# Patient Record
Sex: MALE | Race: WHITE | Employment: OTHER | URBAN - METROPOLITAN AREA
[De-identification: names, ages, dates, MRNs, and addresses within clinical notes are randomized per-mention and may not be internally consistent; named-entity substitution may affect disease eponyms.]

---

## 2018-01-24 RX ORDER — INSULIN GLARGINE 100 [IU]/ML
36 INJECTION, SOLUTION SUBCUTANEOUS
COMMUNITY
Start: 2017-11-28

## 2018-01-24 RX ORDER — AMLODIPINE BESYLATE 10 MG/1
10 TABLET ORAL DAILY
COMMUNITY
Start: 2017-12-21

## 2018-01-24 RX ORDER — METFORMIN HYDROCHLORIDE 500 MG/1
500 TABLET ORAL 2 TIMES DAILY WITH MEALS
COMMUNITY

## 2018-01-24 RX ORDER — LISINOPRIL 40 MG/1
40 TABLET ORAL DAILY
COMMUNITY
Start: 2017-11-27

## 2018-01-24 RX ORDER — SITAGLIPTIN 100 MG/1
100 TABLET, FILM COATED ORAL DAILY
COMMUNITY
Start: 2017-12-22

## 2018-01-24 RX ORDER — HYDROCHLOROTHIAZIDE 25 MG/1
12.5 TABLET ORAL DAILY
COMMUNITY
Start: 2017-12-11

## 2018-01-24 RX ORDER — ASPIRIN 81 MG/1
81 TABLET ORAL DAILY
COMMUNITY

## 2018-01-24 RX ORDER — ATORVASTATIN CALCIUM 20 MG/1
20 TABLET, FILM COATED ORAL DAILY
COMMUNITY
Start: 2017-12-11

## 2018-01-26 ENCOUNTER — ANESTHESIA EVENT (OUTPATIENT)
Dept: SURGERY | Age: 58
DRG: 470 | End: 2018-01-26
Payer: COMMERCIAL

## 2018-01-29 ENCOUNTER — APPOINTMENT (OUTPATIENT)
Dept: GENERAL RADIOLOGY | Age: 58
DRG: 470 | End: 2018-01-29
Attending: ORTHOPAEDIC SURGERY
Payer: COMMERCIAL

## 2018-01-29 ENCOUNTER — ANESTHESIA (OUTPATIENT)
Dept: SURGERY | Age: 58
DRG: 470 | End: 2018-01-29
Payer: COMMERCIAL

## 2018-01-29 ENCOUNTER — HOSPITAL ENCOUNTER (INPATIENT)
Age: 58
LOS: 2 days | Discharge: HOME HEALTH | DRG: 470 | End: 2018-01-31
Attending: ORTHOPAEDIC SURGERY | Admitting: ORTHOPAEDIC SURGERY
Payer: COMMERCIAL

## 2018-01-29 DIAGNOSIS — M17.12 PRIMARY OSTEOARTHRITIS OF LEFT KNEE: Primary | ICD-10-CM

## 2018-01-29 PROBLEM — E11.9 DM (DIABETES MELLITUS) (HCC): Status: ACTIVE | Noted: 2018-01-29

## 2018-01-29 PROBLEM — E78.5 HLD (HYPERLIPIDEMIA): Status: ACTIVE | Noted: 2018-01-29

## 2018-01-29 PROBLEM — I10 HTN (HYPERTENSION): Status: ACTIVE | Noted: 2018-01-29

## 2018-01-29 PROBLEM — G47.33 OSA (OBSTRUCTIVE SLEEP APNEA): Status: ACTIVE | Noted: 2018-01-29

## 2018-01-29 LAB
ANION GAP SERPL CALC-SCNC: 11 MMOL/L (ref 3–18)
APTT PPP: 23.5 SEC (ref 23–36.4)
BUN SERPL-MCNC: 17 MG/DL (ref 7–18)
BUN/CREAT SERPL: 20 (ref 12–20)
CALCIUM SERPL-MCNC: 8.5 MG/DL (ref 8.5–10.1)
CHLORIDE SERPL-SCNC: 105 MMOL/L (ref 100–108)
CO2 SERPL-SCNC: 26 MMOL/L (ref 21–32)
CREAT SERPL-MCNC: 0.86 MG/DL (ref 0.6–1.3)
EST. AVERAGE GLUCOSE BLD GHB EST-MCNC: 120 MG/DL
GLUCOSE BLD STRIP.AUTO-MCNC: 117 MG/DL (ref 70–110)
GLUCOSE BLD STRIP.AUTO-MCNC: 146 MG/DL (ref 70–110)
GLUCOSE BLD STRIP.AUTO-MCNC: 155 MG/DL (ref 70–110)
GLUCOSE BLD STRIP.AUTO-MCNC: 155 MG/DL (ref 70–110)
GLUCOSE SERPL-MCNC: 185 MG/DL (ref 74–99)
HBA1C MFR BLD: 5.8 % (ref 4.2–5.6)
HCT VFR BLD AUTO: 39.2 % (ref 36–48)
HGB BLD-MCNC: 13.1 G/DL (ref 13–16)
INR PPP: 0.9 (ref 0.8–1.2)
POTASSIUM SERPL-SCNC: 3.1 MMOL/L (ref 3.5–5.5)
PROTHROMBIN TIME: 12 SEC (ref 11.5–15.2)
SODIUM SERPL-SCNC: 142 MMOL/L (ref 136–145)

## 2018-01-29 PROCEDURE — 77030008462 HC STPLR SKN PROX J&J -A: Performed by: ORTHOPAEDIC SURGERY

## 2018-01-29 PROCEDURE — C1713 ANCHOR/SCREW BN/BN,TIS/BN: HCPCS | Performed by: ORTHOPAEDIC SURGERY

## 2018-01-29 PROCEDURE — 77030020257 HC SOL INJ SOD CL 0.45% 1000ML BG

## 2018-01-29 PROCEDURE — 76210000016 HC OR PH I REC 1 TO 1.5 HR: Performed by: ORTHOPAEDIC SURGERY

## 2018-01-29 PROCEDURE — 77030008477 HC STYL SATN SLP COVD -A: Performed by: ANESTHESIOLOGY

## 2018-01-29 PROCEDURE — 74011250637 HC RX REV CODE- 250/637: Performed by: ORTHOPAEDIC SURGERY

## 2018-01-29 PROCEDURE — 77030013708 HC HNDPC SUC IRR PULS STRY –B: Performed by: ORTHOPAEDIC SURGERY

## 2018-01-29 PROCEDURE — 85730 THROMBOPLASTIN TIME PARTIAL: CPT | Performed by: ORTHOPAEDIC SURGERY

## 2018-01-29 PROCEDURE — 74011250636 HC RX REV CODE- 250/636: Performed by: NURSE ANESTHETIST, CERTIFIED REGISTERED

## 2018-01-29 PROCEDURE — 36415 COLL VENOUS BLD VENIPUNCTURE: CPT | Performed by: ORTHOPAEDIC SURGERY

## 2018-01-29 PROCEDURE — 77030006835 HC BLD SAW SAG STRY -B: Performed by: ORTHOPAEDIC SURGERY

## 2018-01-29 PROCEDURE — 77030006774 HC BLD SAW OSC BRSM -B: Performed by: ORTHOPAEDIC SURGERY

## 2018-01-29 PROCEDURE — 77030003666 HC NDL SPINAL BD -A: Performed by: ORTHOPAEDIC SURGERY

## 2018-01-29 PROCEDURE — 77030032490 HC SLV COMPR SCD KNE COVD -B: Performed by: ORTHOPAEDIC SURGERY

## 2018-01-29 PROCEDURE — 83036 HEMOGLOBIN GLYCOSYLATED A1C: CPT | Performed by: NURSE PRACTITIONER

## 2018-01-29 PROCEDURE — 76010000171 HC OR TIME 2 TO 2.5 HR INTENSV-TIER 1: Performed by: ORTHOPAEDIC SURGERY

## 2018-01-29 PROCEDURE — 97530 THERAPEUTIC ACTIVITIES: CPT

## 2018-01-29 PROCEDURE — 85610 PROTHROMBIN TIME: CPT | Performed by: ORTHOPAEDIC SURGERY

## 2018-01-29 PROCEDURE — 74011250636 HC RX REV CODE- 250/636: Performed by: ORTHOPAEDIC SURGERY

## 2018-01-29 PROCEDURE — 74011000250 HC RX REV CODE- 250: Performed by: ORTHOPAEDIC SURGERY

## 2018-01-29 PROCEDURE — 76060000035 HC ANESTHESIA 2 TO 2.5 HR: Performed by: ORTHOPAEDIC SURGERY

## 2018-01-29 PROCEDURE — 77030010785: Performed by: ORTHOPAEDIC SURGERY

## 2018-01-29 PROCEDURE — 74011000272 HC RX REV CODE- 272: Performed by: ORTHOPAEDIC SURGERY

## 2018-01-29 PROCEDURE — 77030020754 HC CUF TRNQT 2BLA STRY -B: Performed by: ORTHOPAEDIC SURGERY

## 2018-01-29 PROCEDURE — 77030027138 HC INCENT SPIROMETER -A

## 2018-01-29 PROCEDURE — C1776 JOINT DEVICE (IMPLANTABLE): HCPCS | Performed by: ORTHOPAEDIC SURGERY

## 2018-01-29 PROCEDURE — 77030013079 HC BLNKT BAIR HGGR 3M -A: Performed by: ANESTHESIOLOGY

## 2018-01-29 PROCEDURE — 97161 PT EVAL LOW COMPLEX 20 MIN: CPT

## 2018-01-29 PROCEDURE — 74011636637 HC RX REV CODE- 636/637

## 2018-01-29 PROCEDURE — 97162 PT EVAL MOD COMPLEX 30 MIN: CPT

## 2018-01-29 PROCEDURE — 74011636637 HC RX REV CODE- 636/637: Performed by: NURSE PRACTITIONER

## 2018-01-29 PROCEDURE — 82962 GLUCOSE BLOOD TEST: CPT

## 2018-01-29 PROCEDURE — 77030018836 HC SOL IRR NACL ICUM -A: Performed by: ORTHOPAEDIC SURGERY

## 2018-01-29 PROCEDURE — 80048 BASIC METABOLIC PNL TOTAL CA: CPT | Performed by: ORTHOPAEDIC SURGERY

## 2018-01-29 PROCEDURE — 0SRD0J9 REPLACEMENT OF LEFT KNEE JOINT WITH SYNTHETIC SUBSTITUTE, CEMENTED, OPEN APPROACH: ICD-10-PCS | Performed by: ORTHOPAEDIC SURGERY

## 2018-01-29 PROCEDURE — 77030003029 HC SUT VCRL J&J -B: Performed by: ORTHOPAEDIC SURGERY

## 2018-01-29 PROCEDURE — 85018 HEMOGLOBIN: CPT | Performed by: ORTHOPAEDIC SURGERY

## 2018-01-29 PROCEDURE — 74011250637 HC RX REV CODE- 250/637: Performed by: NURSE ANESTHETIST, CERTIFIED REGISTERED

## 2018-01-29 PROCEDURE — 74011000258 HC RX REV CODE- 258: Performed by: ORTHOPAEDIC SURGERY

## 2018-01-29 PROCEDURE — 65270000029 HC RM PRIVATE

## 2018-01-29 PROCEDURE — 74011250636 HC RX REV CODE- 250/636

## 2018-01-29 PROCEDURE — 77030011640 HC PAD GRND REM COVD -A: Performed by: ORTHOPAEDIC SURGERY

## 2018-01-29 PROCEDURE — 74011000250 HC RX REV CODE- 250

## 2018-01-29 PROCEDURE — 77030031139 HC SUT VCRL2 J&J -A: Performed by: ORTHOPAEDIC SURGERY

## 2018-01-29 PROCEDURE — 73560 X-RAY EXAM OF KNEE 1 OR 2: CPT

## 2018-01-29 PROCEDURE — 77030008683 HC TU ET CUF COVD -A: Performed by: ANESTHESIOLOGY

## 2018-01-29 DEVICE — CEMENT BNE 20ML 41GM FULL DOSE PMMA W/ TOBRA M VISC RADPQ: Type: IMPLANTABLE DEVICE | Site: KNEE | Status: FUNCTIONAL

## 2018-01-29 DEVICE — IMPLANTABLE DEVICE: Type: IMPLANTABLE DEVICE | Site: KNEE | Status: FUNCTIONAL

## 2018-01-29 DEVICE — IMPLANT PAT DIA36MM THK10MM X3 SYMMETRIC TRIATHLON: Type: IMPLANTABLE DEVICE | Site: KNEE | Status: FUNCTIONAL

## 2018-01-29 DEVICE — FEM KNE CEM CR SZ 5 LT -- TRIATHLON: Type: IMPLANTABLE DEVICE | Site: KNEE | Status: FUNCTIONAL

## 2018-01-29 DEVICE — COMPONENT KNEE CEM X3 TRIATHLON: Type: IMPLANTABLE DEVICE | Site: KNEE | Status: FUNCTIONAL

## 2018-01-29 RX ORDER — FENTANYL CITRATE 50 UG/ML
50 INJECTION, SOLUTION INTRAMUSCULAR; INTRAVENOUS AS NEEDED
Status: DISCONTINUED | OUTPATIENT
Start: 2018-01-29 | End: 2018-01-29 | Stop reason: HOSPADM

## 2018-01-29 RX ORDER — HYDROCHLOROTHIAZIDE 25 MG/1
12.5 TABLET ORAL DAILY
Status: DISCONTINUED | OUTPATIENT
Start: 2018-01-29 | End: 2018-01-31 | Stop reason: HOSPADM

## 2018-01-29 RX ORDER — PROPOFOL 10 MG/ML
INJECTION, EMULSION INTRAVENOUS AS NEEDED
Status: DISCONTINUED | OUTPATIENT
Start: 2018-01-29 | End: 2018-01-29 | Stop reason: HOSPADM

## 2018-01-29 RX ORDER — GLYCOPYRROLATE 0.2 MG/ML
INJECTION INTRAMUSCULAR; INTRAVENOUS AS NEEDED
Status: DISCONTINUED | OUTPATIENT
Start: 2018-01-29 | End: 2018-01-29 | Stop reason: HOSPADM

## 2018-01-29 RX ORDER — ENOXAPARIN SODIUM 100 MG/ML
40 INJECTION SUBCUTANEOUS EVERY 24 HOURS
Status: DISCONTINUED | OUTPATIENT
Start: 2018-01-29 | End: 2018-01-31 | Stop reason: HOSPADM

## 2018-01-29 RX ORDER — SODIUM CHLORIDE 0.9 % (FLUSH) 0.9 %
5-10 SYRINGE (ML) INJECTION AS NEEDED
Status: DISCONTINUED | OUTPATIENT
Start: 2018-01-29 | End: 2018-01-31 | Stop reason: HOSPADM

## 2018-01-29 RX ORDER — ACETAMINOPHEN 325 MG/1
650 TABLET ORAL
Status: DISCONTINUED | OUTPATIENT
Start: 2018-01-29 | End: 2018-01-31 | Stop reason: HOSPADM

## 2018-01-29 RX ORDER — HYDROCODONE BITARTRATE AND ACETAMINOPHEN 5; 325 MG/1; MG/1
1 TABLET ORAL ONCE
Status: DISCONTINUED | OUTPATIENT
Start: 2018-01-29 | End: 2018-01-29 | Stop reason: HOSPADM

## 2018-01-29 RX ORDER — DOCUSATE SODIUM 100 MG/1
100 CAPSULE, LIQUID FILLED ORAL 2 TIMES DAILY
Status: DISCONTINUED | OUTPATIENT
Start: 2018-01-29 | End: 2018-01-31 | Stop reason: HOSPADM

## 2018-01-29 RX ORDER — DEXTROSE 50 % IN WATER (D50W) INTRAVENOUS SYRINGE
25-50 AS NEEDED
Status: DISCONTINUED | OUTPATIENT
Start: 2018-01-29 | End: 2018-01-29 | Stop reason: HOSPADM

## 2018-01-29 RX ORDER — MAGNESIUM SULFATE 100 %
4 CRYSTALS MISCELLANEOUS AS NEEDED
Status: DISCONTINUED | OUTPATIENT
Start: 2018-01-29 | End: 2018-01-29 | Stop reason: HOSPADM

## 2018-01-29 RX ORDER — MORPHINE SULFATE 2 MG/ML
2 INJECTION, SOLUTION INTRAMUSCULAR; INTRAVENOUS
Status: DISCONTINUED | OUTPATIENT
Start: 2018-01-29 | End: 2018-01-30

## 2018-01-29 RX ORDER — DEXTROSE 50 % IN WATER (D50W) INTRAVENOUS SYRINGE
25-50 AS NEEDED
Status: DISCONTINUED | OUTPATIENT
Start: 2018-01-29 | End: 2018-01-31 | Stop reason: HOSPADM

## 2018-01-29 RX ORDER — ONDANSETRON 2 MG/ML
INJECTION INTRAMUSCULAR; INTRAVENOUS AS NEEDED
Status: DISCONTINUED | OUTPATIENT
Start: 2018-01-29 | End: 2018-01-29 | Stop reason: HOSPADM

## 2018-01-29 RX ORDER — MIDAZOLAM HYDROCHLORIDE 1 MG/ML
INJECTION, SOLUTION INTRAMUSCULAR; INTRAVENOUS AS NEEDED
Status: DISCONTINUED | OUTPATIENT
Start: 2018-01-29 | End: 2018-01-29 | Stop reason: HOSPADM

## 2018-01-29 RX ORDER — CEFAZOLIN SODIUM 2 G/50ML
2 SOLUTION INTRAVENOUS
Status: COMPLETED | OUTPATIENT
Start: 2018-01-29 | End: 2018-01-29

## 2018-01-29 RX ORDER — LIDOCAINE HYDROCHLORIDE 20 MG/ML
INJECTION, SOLUTION EPIDURAL; INFILTRATION; INTRACAUDAL; PERINEURAL AS NEEDED
Status: DISCONTINUED | OUTPATIENT
Start: 2018-01-29 | End: 2018-01-29 | Stop reason: HOSPADM

## 2018-01-29 RX ORDER — HYDROMORPHONE HYDROCHLORIDE 1 MG/ML
INJECTION, SOLUTION INTRAMUSCULAR; INTRAVENOUS; SUBCUTANEOUS AS NEEDED
Status: DISCONTINUED | OUTPATIENT
Start: 2018-01-29 | End: 2018-01-29 | Stop reason: HOSPADM

## 2018-01-29 RX ORDER — SODIUM CHLORIDE 0.9 % (FLUSH) 0.9 %
5-10 SYRINGE (ML) INJECTION EVERY 8 HOURS
Status: DISCONTINUED | OUTPATIENT
Start: 2018-01-29 | End: 2018-01-31 | Stop reason: HOSPADM

## 2018-01-29 RX ORDER — HYDROMORPHONE HYDROCHLORIDE 2 MG/ML
0.5 INJECTION, SOLUTION INTRAMUSCULAR; INTRAVENOUS; SUBCUTANEOUS
Status: DISCONTINUED | OUTPATIENT
Start: 2018-01-29 | End: 2018-01-29 | Stop reason: HOSPADM

## 2018-01-29 RX ORDER — SODIUM CHLORIDE, SODIUM LACTATE, POTASSIUM CHLORIDE, CALCIUM CHLORIDE 600; 310; 30; 20 MG/100ML; MG/100ML; MG/100ML; MG/100ML
75 INJECTION, SOLUTION INTRAVENOUS CONTINUOUS
Status: DISCONTINUED | OUTPATIENT
Start: 2018-01-29 | End: 2018-01-29 | Stop reason: HOSPADM

## 2018-01-29 RX ORDER — INSULIN LISPRO 100 [IU]/ML
INJECTION, SOLUTION INTRAVENOUS; SUBCUTANEOUS
Status: DISPENSED
Start: 2018-01-29 | End: 2018-01-29

## 2018-01-29 RX ORDER — INSULIN LISPRO 100 [IU]/ML
INJECTION, SOLUTION INTRAVENOUS; SUBCUTANEOUS
Status: COMPLETED
Start: 2018-01-29 | End: 2018-01-29

## 2018-01-29 RX ORDER — KETOROLAC TROMETHAMINE 30 MG/ML
INJECTION, SOLUTION INTRAMUSCULAR; INTRAVENOUS AS NEEDED
Status: DISCONTINUED | OUTPATIENT
Start: 2018-01-29 | End: 2018-01-29 | Stop reason: HOSPADM

## 2018-01-29 RX ORDER — ZOLPIDEM TARTRATE 5 MG/1
5 TABLET ORAL
Status: DISCONTINUED | OUTPATIENT
Start: 2018-01-29 | End: 2018-01-31 | Stop reason: HOSPADM

## 2018-01-29 RX ORDER — METFORMIN HYDROCHLORIDE 500 MG/1
500 TABLET ORAL 2 TIMES DAILY WITH MEALS
Status: DISCONTINUED | OUTPATIENT
Start: 2018-01-29 | End: 2018-01-29

## 2018-01-29 RX ORDER — ONDANSETRON 2 MG/ML
4 INJECTION INTRAMUSCULAR; INTRAVENOUS
Status: DISCONTINUED | OUTPATIENT
Start: 2018-01-29 | End: 2018-01-31 | Stop reason: HOSPADM

## 2018-01-29 RX ORDER — LISINOPRIL 40 MG/1
40 TABLET ORAL DAILY
Status: DISCONTINUED | OUTPATIENT
Start: 2018-01-29 | End: 2018-01-31 | Stop reason: HOSPADM

## 2018-01-29 RX ORDER — NALOXONE HYDROCHLORIDE 0.4 MG/ML
0.4 INJECTION, SOLUTION INTRAMUSCULAR; INTRAVENOUS; SUBCUTANEOUS AS NEEDED
Status: DISCONTINUED | OUTPATIENT
Start: 2018-01-29 | End: 2018-01-31 | Stop reason: HOSPADM

## 2018-01-29 RX ORDER — OXYCODONE HYDROCHLORIDE 5 MG/1
5 TABLET ORAL
Status: DISCONTINUED | OUTPATIENT
Start: 2018-01-29 | End: 2018-01-30

## 2018-01-29 RX ORDER — AMLODIPINE BESYLATE 10 MG/1
10 TABLET ORAL DAILY
Status: DISCONTINUED | OUTPATIENT
Start: 2018-01-29 | End: 2018-01-31 | Stop reason: HOSPADM

## 2018-01-29 RX ORDER — VECURONIUM BROMIDE FOR INJECTION 1 MG/ML
INJECTION, POWDER, LYOPHILIZED, FOR SOLUTION INTRAVENOUS AS NEEDED
Status: DISCONTINUED | OUTPATIENT
Start: 2018-01-29 | End: 2018-01-29 | Stop reason: HOSPADM

## 2018-01-29 RX ORDER — SODIUM CHLORIDE, SODIUM LACTATE, POTASSIUM CHLORIDE, CALCIUM CHLORIDE 600; 310; 30; 20 MG/100ML; MG/100ML; MG/100ML; MG/100ML
75 INJECTION, SOLUTION INTRAVENOUS CONTINUOUS
Status: DISCONTINUED | OUTPATIENT
Start: 2018-01-29 | End: 2018-01-30

## 2018-01-29 RX ORDER — INSULIN LISPRO 100 [IU]/ML
INJECTION, SOLUTION INTRAVENOUS; SUBCUTANEOUS
Status: DISCONTINUED | OUTPATIENT
Start: 2018-01-29 | End: 2018-01-31 | Stop reason: HOSPADM

## 2018-01-29 RX ORDER — FAMOTIDINE 20 MG/1
20 TABLET, FILM COATED ORAL ONCE
Status: COMPLETED | OUTPATIENT
Start: 2018-01-29 | End: 2018-01-29

## 2018-01-29 RX ORDER — INSULIN LISPRO 100 [IU]/ML
INJECTION, SOLUTION INTRAVENOUS; SUBCUTANEOUS ONCE
Status: COMPLETED | OUTPATIENT
Start: 2018-01-29 | End: 2018-01-29

## 2018-01-29 RX ORDER — DIPHENHYDRAMINE HCL 25 MG
25 CAPSULE ORAL
Status: DISCONTINUED | OUTPATIENT
Start: 2018-01-29 | End: 2018-01-31 | Stop reason: HOSPADM

## 2018-01-29 RX ORDER — ASPIRIN 81 MG/1
81 TABLET ORAL DAILY
Status: DISCONTINUED | OUTPATIENT
Start: 2018-01-29 | End: 2018-01-31 | Stop reason: HOSPADM

## 2018-01-29 RX ORDER — DIPHENHYDRAMINE HYDROCHLORIDE 50 MG/ML
12.5 INJECTION, SOLUTION INTRAMUSCULAR; INTRAVENOUS
Status: DISCONTINUED | OUTPATIENT
Start: 2018-01-29 | End: 2018-01-31 | Stop reason: HOSPADM

## 2018-01-29 RX ORDER — NEOSTIGMINE METHYLSULFATE 5 MG/5 ML
SYRINGE (ML) INTRAVENOUS AS NEEDED
Status: DISCONTINUED | OUTPATIENT
Start: 2018-01-29 | End: 2018-01-29 | Stop reason: HOSPADM

## 2018-01-29 RX ORDER — DIPHENHYDRAMINE HYDROCHLORIDE 50 MG/ML
25 INJECTION, SOLUTION INTRAMUSCULAR; INTRAVENOUS
Status: DISCONTINUED | OUTPATIENT
Start: 2018-01-29 | End: 2018-01-29 | Stop reason: HOSPADM

## 2018-01-29 RX ORDER — FENTANYL CITRATE 50 UG/ML
INJECTION, SOLUTION INTRAMUSCULAR; INTRAVENOUS AS NEEDED
Status: DISCONTINUED | OUTPATIENT
Start: 2018-01-29 | End: 2018-01-29 | Stop reason: HOSPADM

## 2018-01-29 RX ORDER — INSULIN LISPRO 100 [IU]/ML
INJECTION, SOLUTION INTRAVENOUS; SUBCUTANEOUS ONCE
Status: DISCONTINUED | OUTPATIENT
Start: 2018-01-29 | End: 2018-01-29 | Stop reason: HOSPADM

## 2018-01-29 RX ORDER — ATORVASTATIN CALCIUM 20 MG/1
20 TABLET, FILM COATED ORAL DAILY
Status: DISCONTINUED | OUTPATIENT
Start: 2018-01-29 | End: 2018-01-31 | Stop reason: HOSPADM

## 2018-01-29 RX ORDER — MAGNESIUM SULFATE 100 %
4 CRYSTALS MISCELLANEOUS AS NEEDED
Status: DISCONTINUED | OUTPATIENT
Start: 2018-01-29 | End: 2018-01-31 | Stop reason: HOSPADM

## 2018-01-29 RX ORDER — DEXAMETHASONE SODIUM PHOSPHATE 4 MG/ML
INJECTION, SOLUTION INTRA-ARTICULAR; INTRALESIONAL; INTRAMUSCULAR; INTRAVENOUS; SOFT TISSUE AS NEEDED
Status: DISCONTINUED | OUTPATIENT
Start: 2018-01-29 | End: 2018-01-29 | Stop reason: HOSPADM

## 2018-01-29 RX ORDER — CEFAZOLIN SODIUM 2 G/50ML
2 SOLUTION INTRAVENOUS EVERY 8 HOURS
Status: DISCONTINUED | OUTPATIENT
Start: 2018-01-29 | End: 2018-01-29

## 2018-01-29 RX ORDER — CEFAZOLIN SODIUM 2 G/50ML
2 SOLUTION INTRAVENOUS EVERY 8 HOURS
Status: COMPLETED | OUTPATIENT
Start: 2018-01-29 | End: 2018-01-30

## 2018-01-29 RX ORDER — LIDOCAINE HYDROCHLORIDE 10 MG/ML
0.1 INJECTION INFILTRATION; PERINEURAL AS NEEDED
Status: DISCONTINUED | OUTPATIENT
Start: 2018-01-29 | End: 2018-01-29 | Stop reason: HOSPADM

## 2018-01-29 RX ADMIN — LISINOPRIL 40 MG: 40 TABLET ORAL at 12:00

## 2018-01-29 RX ADMIN — ENOXAPARIN SODIUM 40 MG: 40 INJECTION SUBCUTANEOUS at 16:15

## 2018-01-29 RX ADMIN — HYDROMORPHONE HYDROCHLORIDE 0.5 MG: 2 INJECTION INTRAMUSCULAR; INTRAVENOUS; SUBCUTANEOUS at 10:25

## 2018-01-29 RX ADMIN — HYDROMORPHONE HYDROCHLORIDE 1 MG: 1 INJECTION, SOLUTION INTRAMUSCULAR; INTRAVENOUS; SUBCUTANEOUS at 09:32

## 2018-01-29 RX ADMIN — INSULIN LISPRO 3 UNITS: 100 INJECTION, SOLUTION INTRAVENOUS; SUBCUTANEOUS at 10:45

## 2018-01-29 RX ADMIN — MIDAZOLAM HYDROCHLORIDE 2 MG: 1 INJECTION, SOLUTION INTRAMUSCULAR; INTRAVENOUS at 07:36

## 2018-01-29 RX ADMIN — OXYCODONE HYDROCHLORIDE 5 MG: 5 TABLET ORAL at 16:32

## 2018-01-29 RX ADMIN — LIDOCAINE HYDROCHLORIDE 100 MG: 20 INJECTION, SOLUTION EPIDURAL; INFILTRATION; INTRACAUDAL; PERINEURAL at 07:43

## 2018-01-29 RX ADMIN — SODIUM CHLORIDE, SODIUM LACTATE, POTASSIUM CHLORIDE, AND CALCIUM CHLORIDE 75 ML/HR: 600; 310; 30; 20 INJECTION, SOLUTION INTRAVENOUS at 17:23

## 2018-01-29 RX ADMIN — KETOROLAC TROMETHAMINE 30 MG: 30 INJECTION, SOLUTION INTRAMUSCULAR; INTRAVENOUS at 09:53

## 2018-01-29 RX ADMIN — HYDROMORPHONE HYDROCHLORIDE 0.5 MG: 2 INJECTION INTRAMUSCULAR; INTRAVENOUS; SUBCUTANEOUS at 10:30

## 2018-01-29 RX ADMIN — FENTANYL CITRATE 50 MCG: 50 INJECTION, SOLUTION INTRAMUSCULAR; INTRAVENOUS at 10:00

## 2018-01-29 RX ADMIN — CEFAZOLIN SODIUM 2 G: 2 SOLUTION INTRAVENOUS at 07:38

## 2018-01-29 RX ADMIN — AMLODIPINE BESYLATE 10 MG: 10 TABLET ORAL at 12:00

## 2018-01-29 RX ADMIN — ACETAMINOPHEN 650 MG: 325 TABLET, FILM COATED ORAL at 14:03

## 2018-01-29 RX ADMIN — HYDROCHLOROTHIAZIDE 12.5 MG: 25 TABLET ORAL at 14:03

## 2018-01-29 RX ADMIN — HYDROMORPHONE HYDROCHLORIDE 0.5 MG: 2 INJECTION INTRAMUSCULAR; INTRAVENOUS; SUBCUTANEOUS at 10:20

## 2018-01-29 RX ADMIN — FENTANYL CITRATE 50 MCG: 50 INJECTION, SOLUTION INTRAMUSCULAR; INTRAVENOUS at 10:10

## 2018-01-29 RX ADMIN — SODIUM CHLORIDE, SODIUM LACTATE, POTASSIUM CHLORIDE, AND CALCIUM CHLORIDE 75 ML/HR: 600; 310; 30; 20 INJECTION, SOLUTION INTRAVENOUS at 07:31

## 2018-01-29 RX ADMIN — GLYCOPYRROLATE 0.4 MG: 0.2 INJECTION INTRAMUSCULAR; INTRAVENOUS at 09:33

## 2018-01-29 RX ADMIN — VECURONIUM BROMIDE FOR INJECTION 8 MG: 1 INJECTION, POWDER, LYOPHILIZED, FOR SOLUTION INTRAVENOUS at 07:44

## 2018-01-29 RX ADMIN — ASPIRIN 81 MG: 81 TABLET, COATED ORAL at 12:00

## 2018-01-29 RX ADMIN — FENTANYL CITRATE 100 MCG: 50 INJECTION, SOLUTION INTRAMUSCULAR; INTRAVENOUS at 07:43

## 2018-01-29 RX ADMIN — VECURONIUM BROMIDE FOR INJECTION 2 MG: 1 INJECTION, POWDER, LYOPHILIZED, FOR SOLUTION INTRAVENOUS at 08:33

## 2018-01-29 RX ADMIN — FENTANYL CITRATE 100 MCG: 50 INJECTION, SOLUTION INTRAMUSCULAR; INTRAVENOUS at 08:33

## 2018-01-29 RX ADMIN — FAMOTIDINE 20 MG: 20 TABLET, FILM COATED ORAL at 07:32

## 2018-01-29 RX ADMIN — HYDROMORPHONE HYDROCHLORIDE 0.5 MG: 2 INJECTION INTRAMUSCULAR; INTRAVENOUS; SUBCUTANEOUS at 10:35

## 2018-01-29 RX ADMIN — ATORVASTATIN CALCIUM 20 MG: 20 TABLET, FILM COATED ORAL at 12:00

## 2018-01-29 RX ADMIN — CEFAZOLIN SODIUM 2 G: 2 SOLUTION INTRAVENOUS at 16:15

## 2018-01-29 RX ADMIN — DOCUSATE SODIUM 100 MG: 100 CAPSULE, LIQUID FILLED ORAL at 12:00

## 2018-01-29 RX ADMIN — PROPOFOL 150 MG: 10 INJECTION, EMULSION INTRAVENOUS at 07:44

## 2018-01-29 RX ADMIN — Medication 3 MG: at 09:33

## 2018-01-29 RX ADMIN — ONDANSETRON 4 MG: 2 INJECTION INTRAMUSCULAR; INTRAVENOUS at 09:33

## 2018-01-29 RX ADMIN — Medication 10 ML: at 22:20

## 2018-01-29 RX ADMIN — OXYCODONE HYDROCHLORIDE 5 MG: 5 TABLET ORAL at 22:18

## 2018-01-29 RX ADMIN — DEXAMETHASONE SODIUM PHOSPHATE 4 MG: 4 INJECTION, SOLUTION INTRA-ARTICULAR; INTRALESIONAL; INTRAMUSCULAR; INTRAVENOUS; SOFT TISSUE at 07:57

## 2018-01-29 RX ADMIN — DOCUSATE SODIUM 100 MG: 100 CAPSULE, LIQUID FILLED ORAL at 18:20

## 2018-01-29 RX ADMIN — INSULIN DETEMIR 30 UNITS: 100 INJECTION, SOLUTION SUBCUTANEOUS at 22:17

## 2018-01-29 NOTE — PROGRESS NOTES
conducted a pre-surgery visit with Félix Carney, who is a 59 y.o.,male. The  provided the following Interventions:  Initiated a relationship of care and support. Offered prayer and assurance of continued prayers on patient's behalf. Plan:  Chaplains will continue to follow and will provide pastoral care on an as needed/requested basis.  recommends bedside caregivers page  on duty if patient shows signs of acute spiritual or emotional distress.     [address] [facility]   Staff [address] [place] [address]   [phone]

## 2018-01-29 NOTE — PROGRESS NOTES
TRANSFER - IN REPORT:    Verbal report received from MALICK(name) on Raciel  being received from PACU(unit) for routine post - op      Report consisted of patients Situation, Background, Assessment and   Recommendations(SBAR). Information from the following report(s) SBAR, OR Summary and Intake/Output was reviewed with the receiving nurse. Opportunity for questions and clarification was provided. Assessment completed upon patients arrival to unit and care assumed. 1115 Received pt via bed awake and alert. Left leg in ace wrap on pillow with ice to knee. + pedal pulse. Wearing O2 at 2L via n/c. Oriented to call bell, phone and IS with pt giving return demonstration. Family at [facility].

## 2018-01-29 NOTE — ANESTHESIA POSTPROCEDURE EVALUATION
Post-Anesthesia Evaluation and Assessment    Patient: Tony Ybarra MRN: [id number]  SSN: [id number]    YOB: 1960  Age: 59 y.o. Sex: male       Cardiovascular Function/Vital Signs  Visit Vitals    /69    Pulse 74    Temp 36.5 °C (97.7 °F)    Resp 20    Ht 5' 10\" (1.778 m)    Wt 99.8 kg (220 lb)    SpO2 91%    BMI 31.57 kg/m2       Patient is status post general anesthesia for Procedure(s):  LEFT TOTAL KNEE REPLACEMENT WITH FRANCO, intraarticular joint inject Dr. Sachin Roberts solution. Nausea/Vomiting: None    Postoperative hydration reviewed and adequate. Pain:  Pain Scale 1: Visual (01/29/18 1104)  Pain Intensity 1: 0 (01/29/18 1104)   Managed    Neurological Status:   Neuro (WDL): Within Defined Limits (01/29/18 1000)  Neuro  LUE Motor Response: Purposeful (01/29/18 1000)  LLE Motor Response: Purposeful (01/29/18 1000)  RUE Motor Response: Purposeful (01/29/18 1000)  RLE Motor Response: Purposeful (01/29/18 1000)   At baseline    Mental Status and Level of Consciousness: Arousable    Pulmonary Status:   O2 Device: Oxygen mask (01/29/18 1000)   Adequate oxygenation and airway patent    Complications related to anesthesia: None    Post-anesthesia assessment completed.  No concerns      Signed By: Sunday Kendall MD     January 29, 2018

## 2018-01-29 NOTE — PROGRESS NOTES
1200 Received pt from PACU, alert and oriented x 4. No nausea no vomiting. All extremities moving, can wiggle toes, no numbness no tingling. Incision site dressing dry and intact. IV site no sign of infiltration. No problem swallowing, no shortness of breath. CPAP at bedside, checked by [facility]. Pain under tolerable level at this time. Ice pack on the surgical site. Call bell within reach. 1230 No pain at this time as per pt, no nausea no vomiting. Affected leg kept aligned, pillow in between hips and side rail. Diet tolerated well.    1300 Denies any pain at this time. Encouraged use of IS.    1400 Tylenol given for mild pain. Walked in the room with PT using walker, tolerated well. Stayed on the bedside recliner for an hour. 1830 Pain under control, pt tolerating diet well. No difficulty breathing, no nausea no vomiting. IV site no sign of infiltration. Left leg can wiggle toes, no numbness no tingling. No pillow under the knee. Placed pillow in between the left leg and side rail.

## 2018-01-29 NOTE — IP AVS SNAPSHOT
[address] Patient: Severa Curet MRN: [id number] KNI:[id number] About your hospitalization You were admitted on:  January 29, 2018 You last received care in the:  [facility] You were discharged on:  January 31, 2018 Why you were hospitalized Your primary diagnosis was:  Osteoarthritis Of Left Knee Your diagnoses also included:  Htn (Hypertension), Dm (Diabetes Mellitus) (Hcc), Erik (Obstructive Sleep Apnea), Hld (Hyperlipidemia) Follow-up Information Follow up With Details Comments Contact Info Delma Gooden MD   [address] [facility] [address] 
[phone] Maico Rockwell MD In 1 month  [place] [address] [address] [facility] [address] 
[phone] Discharge Orders None A check bashir indicates which time of day the medication should be taken. My Medications START taking these medications Instructions Each Dose to Equal  
 Morning Noon Evening Bedtime  
 enoxaparin 40 mg/0.4 mL Commonly known as:  LOVENOX Your last dose was: Your next dose is: 0.4 mL by SubCUTAneous route every twenty-four (24) hours. 40 mg HYDROmorphone 2 mg tablet Commonly known as:  DILAUDID Your last dose was: Your next dose is: Take 1-2 Tabs by mouth every four (4) hours as needed. Max Daily Amount: 24 mg.  
 2-4 mg CONTINUE taking these medications Instructions Each Dose to Equal  
 Morning Noon Evening Bedtime  
 amLODIPine 10 mg tablet Commonly known as:  Lizbeth Mott Your last dose was: Your next dose is: Take 10 mg by mouth daily. 10 mg  
    
   
   
   
  
 aspirin delayed-release 81 mg tablet Your last dose was: Your next dose is: Take 81 mg by mouth daily.   
 81 mg  
 atorvastatin 20 mg tablet Commonly known as:  LIPITOR Your last dose was: Your next dose is: Take 20 mg by mouth daily. 20 mg  
    
   
   
   
  
 hydroCHLOROthiazide 25 mg tablet Commonly known as:  HYDRODIURIL Your last dose was: Your next dose is: Take 12.5 mg by mouth daily. 12.5 mg  
    
   
   
   
  
 JANUVIA 100 mg tablet Generic drug:  SITagliptin Your last dose was: Your next dose is: Take 100 mg by mouth daily. 100 mg  
    
   
   
   
  
 LANTUS SOLOSTAR 100 unit/mL (3 mL) Inpn Generic drug:  insulin glargine Your last dose was: Your next dose is:    
   
   
 36 Units by SubCUTAneous route nightly. 36 Units  
    
   
   
   
  
 lisinopril 40 mg tablet Commonly known as:  Lisa Cage Your last dose was: Your next dose is: Take 40 mg by mouth daily. 40 mg  
    
   
   
   
  
 metFORMIN 500 mg tablet Commonly known as:  GLUCOPHAGE Your last dose was: Your next dose is: Take 500 mg by mouth two (2) times daily (with meals). 500 mg Where to Get Your Medications Information on where to get these meds will be given to you by the nurse or doctor. ! Ask your nurse or doctor about these medications  
  enoxaparin 40 mg/0.4 mL HYDROmorphone 2 mg tablet Discharge Instructions STAPLES OUT IN 14 DAYS DISCHARGE SUMMARY from Nurse PATIENT INSTRUCTIONS: 
 
 
F-face looks uneven A-arms unable to move or move unevenly S-speech slurred or non-existent T-time-call 911 as soon as signs and symptoms begin-DO NOT go Back to bed or wait to see if you get better-TIME IS BRAIN.  
 
Warning Signs of HEART ATTACK  
 
 Call 911 if you have these symptoms: 
? Chest discomfort. Most heart attacks involve discomfort in the center of the chest that lasts more than a few minutes, or that goes away and comes back. It can feel like uncomfortable pressure, squeezing, fullness, or pain. ? Discomfort in other areas of the upper body. Symptoms can include pain or discomfort in one or both arms, the back, neck, jaw, or stomach. ? Shortness of breath with or without chest discomfort. ? Other signs may include breaking out in a cold sweat, nausea, or lightheadedness. Don't wait more than five minutes to call [address]! Fast action can save your life. Calling 911 is almost always the fastest way to get lifesaving treatment. Emergency Medical Services staff can begin treatment when they arrive  up to an hour sooner than if someone gets to the hospital by car. The discharge information has been reviewed with the patient. The patient verbalized understanding. Discharge medications reviewed with the patient and appropriate educational materials and side effects teaching were provided. ___________________________________________________________________________________________________________________________________ [facility] Announcement We are excited to announce that we are making your provider's discharge notes available to you in [facility]. You will see these notes when they are completed and signed by the physician that discharged you from your recent hospital stay. If you have any questions or concerns about any information you see in [facility], please call the Health Information Department where you were seen or reach out to your Primary Care Provider for more information about your plan of care. Introducing [facility]! [employer] introduces [facility] patient portal. Now you can access parts of your medical record, email your doctor's office, and request medication refills online. 1. In your internet browser, go to [email or web address] 2. Click on the First Time User? Click Here link in the Sign In box. You will see the New Member Sign Up page. 3. Enter your [employer] Access Code exactly as it appears below. You will not need to use this code after youve completed the sign-up process. If you do not sign up before the expiration date, you must request a new code. · [employer] Access Code: [email or web address] Expires: 5/1/2018 10:56 AM 
 
4. Enter the last four digits of your Social Security Number (xxxx) and Date of Birth (mm/dd/yyyy) as indicated and click Submit. You will be taken to the next sign-up page. 5. Create a Voltage Securityt ID. This will be your [employer] login ID and cannot be changed, so think of one that is secure and easy to remember. 6. Create a [employer] password. You can change your password at any time. 7. Enter your Password Reset Question and Answer. This can be used at a later time if you forget your password. 8. Enter your e-mail address. You will receive e-mail notification when new information is available in [address] 9. Click Sign Up. You can now view and download portions of your medical record. 10. Click the Download Summary menu link to download a portable copy of your medical information. If you have questions, please visit the Frequently Asked Questions section of the [employer] website. Remember, [employer] is NOT to be used for urgent needs. For medical emergencies, dial 911. Now available from your iPhone and Android! Providers Seen During Your Hospitalization Provider Specialty Primary office phone Nestor Alcantara MD Orthopedic Surgery [phone] Your Primary Care Physician (PCP) Primary Care Physician Office Phone Office Fax Errol Medellin [phone] You are allergic to the following No active allergies Recent Documentation Height Weight BMI Smoking Status 1.778 m 99.8 kg 31.57 kg/m2 Never Smoker Emergency Contacts Name Discharge Info Relation Home Work Mobile Carolee Sainz DISCHARGE CAREGIVER [3] Spouse [3] [phone] Patient Belongings The following personal items are in your possession at time of discharge: 
  Dental Appliances: None  Visual Aid: None      Home Medications: None   Jewelry: None  Clothing: Pants, Shirt, Footwear, Sweater, Jacket/Coat, Undergarments    Other Valuables: Cell Phone (CPAP) Discharge Instructions Attachments/References TKR (TOTAL KNEE REPLACEMENT): POST-OP (ENGLISH) Patient Handouts Total Knee Replacement: What to Expect at Home Your Recovery When you leave the hospital, you should be able to move around with a walker or crutches. But you will need someone to help you at home for the next few weeks or until you have more energy and can move around better. If there is no one to help you at home, you may go to a rehabilitation center. You will go home with a bandage and stitches or staples. Change the bandage as your doctor tells you to. Your doctor will remove your stitches or staples 10 to 21 days after your surgery. You may still have some mild pain, and the area may be swollen for 3 to 6 months after surgery. Your knee will continue to improve for 6 to 12 months. You will probably use a walker for 1 to 3 weeks and then use crutches. When you are ready, you can use a cane. You will probably be able to walk on your own in 4 to 8 weeks. You will need to do months of physical rehabilitation (rehab) after a knee replacement. Rehab will help you strengthen the muscles of the knee and help you regain movement. After you recover, your artificial knee will allow you to do normal daily activities with less pain or no pain at all. You may be able to hike, dance, ride a bike, and play golf.  Talk to your doctor about whether you can do more strenuous activities. Always tell your caregivers that you have an artificial knee. How long it will take to walk on your own, return to normal activities, and go back to work depends on your health and how well your rehabilitation (rehab) program goes. The better you do with your rehab exercises, the quicker you will get your strength and movement back. This care sheet gives you a general idea about how long it will take for you to recover. But each person recovers at a different pace. Follow the steps below to get better as quickly as possible. How can you care for yourself at home? Activity ? · Rest when you feel tired. You may take a nap, but do not stay in bed all day. When you sit, use a chair with arms. You can use the arms to help you stand up. ? · Work with your physical therapist to find the best way to exercise. You may be able to take frequent, short walks using crutches or a walker. What you can do as your knee heals will depend on whether your new knee is cemented or uncemented. You may not be able to do certain things for a while if your new knee is uncemented. ? · After your knee has healed enough, you can do more strenuous activities with caution. ¨ You can golf, but use a golf cart, and do not wear shoes with spikes. ¨ You can bike on a flat road or on a stationary bike. Avoid biking up hills. ¨ Your doctor may suggest that you stay away from activities that put stress on your knee. These include tennis or badminton, squash or racquetball, contact sports like football, jumping (such as in basketball), jogging, or running. ¨ Avoid activities where you might fall. These include horseback riding, skiing, and mountain biking. ? · Do not sit for more than 1 hour at a time. Get up and walk around for a while before you sit again. If you must sit for a long time, prop up your leg with a chair or footstool. This will help you avoid swelling. ? · Ask your doctor when you can shower. You may need to take sponge baths until your stitches or staples have been removed. ? · Ask your doctor when you can drive again. It may take up to 8 weeks after knee replacement surgery before it is safe for you to drive. ? · When you get into a car, sit on the edge of the seat. Then pull in your legs, and turn to face the front. ? · You should be able to do many everyday activities 3 to 6 weeks after your surgery. You will probably need to take 4 to 16 weeks off from work. When you can go back to work depends on the type of work you do and how you feel. ? · Ask your doctor when it is okay for you to have sex. ? · Do not lift anything heavier than 10 pounds and do not lift weights for 12 weeks. Diet ? · By the time you leave the hospital, you should be eating your normal diet. If your stomach is upset, try bland, low-fat foods like plain rice, broiled chicken, toast, and yogurt. Your doctor may suggest that you take iron and vitamin supplements. ? · Drink plenty of fluids (unless your doctor tells you not to). ? · Eat healthy foods, and watch your portion sizes. Try to stay at your ideal weight. Too much weight puts more stress on your new knee. ? · You may notice that your bowel movements are not regular right after your surgery. This is common. Try to avoid constipation and straining with bowel movements. You may want to take a fiber supplement every day. If you have not had a bowel movement after a couple of days, ask your doctor about taking a mild laxative. Medicines ? · Your doctor will tell you if and when you can restart your medicines. He or she will also give you instructions about taking any new medicines. ? · If you take blood thinners, such as warfarin (Coumadin), clopidogrel (Plavix), or aspirin, be sure to talk to your doctor. He or she will tell you if and when to start taking those medicines again.  Make sure that you understand exactly what your doctor wants you to do.  
? · Your doctor may give you a blood-thinning medicine to prevent blood clots. If you take a blood thinner, be sure you get instructions about how to take your medicine safely. Blood thinners can cause serious bleeding problems. This medicine could be in pill form or as a shot (injection). If a shot is necessary, your doctor will tell you how to do this. ? · Be safe with medicines. Take pain medicines exactly as directed. ¨ If the doctor gave you a prescription medicine for pain, take it as prescribed. ¨ If you are not taking a prescription pain medicine, ask your doctor if you can take an over-the-counter medicine. ¨ Plan to take your pain medicine 30 minutes before exercises. It is easier to prevent pain before it starts than to stop it once it has started. ? · If you think your pain medicine is making you sick to your stomach: 
¨ Take your medicine after meals (unless your doctor has told you not to). ¨ Ask your doctor for a different pain medicine. ? · If your doctor prescribed antibiotics, take them as directed. Do not stop taking them just because you feel better. You need to take the full course of antibiotics. Incision care ? · You will have a bandage over the cut (incision) and staples or stitches. Take the bandage off when your doctor says it is okay. ? · Your doctor will remove the staples or stitches 10 days to 3 weeks after the surgery and replace them with strips of tape. Leave the tape on for a week or until it falls off. Exercise ? · Your rehab program will give you a number of exercises to do to help you get back your knee's range of motion and strength. Always do them as your therapist tells you. Ice and elevation ? · For pain and swelling, put ice or a cold pack on the area for 10 to 20 minutes at a time. Put a thin cloth between the ice and your skin. Other instructions ? · Continue to wear your support stockings as your doctor says. These help to prevent blood clots. The length of time that you will have to wear them depends on your activity level and the amount of swelling. ? · You have metal pieces in your knee. These may set off some airport metal detectors. Carry a medical alert card that says you have an artificial joint, just in case. Follow-up care is a key part of your treatment and safety. Be sure to make and go to all appointments, and call your doctor if you are having problems. It's also a good idea to know your test results and keep a list of the medicines you take. When should you call for help? Call 911 anytime you think you may need emergency care. For example, call if: 
? · You passed out (lost consciousness). ? · You have severe trouble breathing. ? · You have sudden chest pain and shortness of breath, or you cough up blood. ?Call your doctor now or seek immediate medical care if: 
? · You have signs of infection, such as: 
¨ Increased pain, swelling, warmth, or redness. ¨ Red streaks leading from the incision. ¨ Pus draining from the incision. ¨ A fever. ? · You have signs of a blood clot, such as: 
¨ Pain in your calf, back of the knee, thigh, or groin. ¨ Redness and swelling in your leg or groin. ? · Your incision comes open and begins to bleed, or the bleeding increases. ? · You have pain that does not get better after you take pain medicine. ? Watch closely for changes in your health, and be sure to contact your doctor if: 
? · You do not have a bowel movement after taking a laxative. Where can you learn more? Go to http://tea-carissa.info/. Enter W231 in the search box to learn more about \"Total Knee Replacement: What to Expect at Home. \" Current as of: March 21, 2017 Content Version: 11.4 © 1922-6836 Healthwise, Incorporated.  Care instructions adapted under license by [address] (which disclaims liability or warranty for this information). If you have questions about a medical condition or this instruction, always ask your healthcare professional. [address] any warranty or liability for your use of this information. Please provide this summary of care documentation to your next provider. Signatures-by signing, you are acknowledging that this After Visit Summary has been reviewed with you and you have received a copy. Patient Signature:  ____________________________________________________________ Date:  ____________________________________________________________  
  
Kiya Choi Provider Signature:  ____________________________________________________________ Date:  ____________________________________________________________

## 2018-01-29 NOTE — OP NOTES
Patient: Italo Meehan               Sex: male       DOA: 1/29/2018  YOB: 1960       Age:  59 y.o. MRN: [id number]  [facility] [place][id number]    Date of Procedure: 1/29/2018   Preoperative Diagnosis: m17.12  Postoperative Diagnosis: m17.12    Procedure: Procedure(s):  LEFT TOTAL KNEE REPLACEMENT WITH ALEXANDER, intraarticular joint inject Dr. Luiza jacob  Surgeon: Alex Cole) and Role:     * Xochilt Guevara MD - Primary  Anesthesia: General   Estimated Blood Loss: * No values recorded between 1/29/2018  8:11 AM and 1/29/2018  9:52 AM *  Specimens: * No specimens in log *   Implants:   Implant Name Type Inv. Item Serial No.  Lot No. LRB No. Used Action   CEMENT BNE SIMPLEX TOBRA 4 --  - SN/A  CEMENT BNE SIMPLEX TOBRA 4 --  N/A [employer] HOW [id number] Left 2 Implanted   PAT SYM TRIATHLN X3 [id number] -- TRIATHLON SYMMETRIC X3 - SN/A  PAT SYM TRIATHLN X3 [id number] -- TRIATHLON SYMMETRIC X3 N/A [employer] HOW [id number] Left 1 Implanted   Primary Tibial Baseplate   N/A ALEXANDER JANEEN [id number] Left 1 Implanted   FEM KNE BARBI CR SZ 5 LT -- TRIATHLON - SN/A  FEM KNE BARBI CR SZ 5 LT -- TRIATHLON N/A [employer] HOW [id number] Left 1 Implanted   INSERT TIB CR TRIATHLN X3 5 9M --  - SN/A   INSERT TIB CR TRIATHLN X3 5 9M --  N/A [employer] HOW [id number] Left 1 Implanted       Findings: osteophytes and full thickness cartilage erosions   Complications: none noted  PROCEDURES PERFORMED: left total knee replacement with  Alexander Triathlon CR femur and standard tibia.     INDICATION FOR PROCEDURE: The [unfilled] is a pleasant 59 y.o. male  with failed inoperative treatment measures. X-rays showed severe  wear of the medial compartment and the patellofemoral joint. After  reviewing the risks, benefits, and alternatives, and failing to respond to  nonoperative treatment, she and I agreed to proceed with a left total knee replacement.    She was brought to the operating room for this purpose.     DESCRIPTION OF PROCEDURE: The patient was brought to the  main operating room at [facility]. After obtaining a  satisfactory anesthesia, the left lower extremity was  prepared and draped for surgery with a tourniquet about the thigh  un-inflated. With the surgical site verified and antibiotic delivered, the limb was elevated and exsanguinated. The  tourniquet was inflated to 250 mmHg. the site was verified, and  antibiotic delivery was confirmed. An incision was then made from a  fingerbreadth above the superior pole of the patella down to the medial  border of the tibial tubercle. The incision was carried down sharply  through the skin and subcutaneous tissue. A medial arthrotomy was  performed. The quadriceps tendon was retracted, and suprapatellar  synovial tissue was removed. The knee was flexed, and the patella  was everted. The fat pad was sharply excised. The medial and lateral  meniscus was removed, and the ACL was divided. The anterior fibers  of the iliotibial band were released laterally and medially I released to  the level of the medial collateral ligament. A Hohmann retractor was  placed beneath the MCL, and laterally, a retractor was placed to  protect the posterolateral corner and lateral ligament structures. A  rongeur was used to open the intramedullary canal of the femur,  followed by a 5/16-inch drill, and the intramedullary guide olga was set  at 6 degrees right, with an 8 mm resection level. I secured the cutting  block in place with two pins and verified the proposed cut. The  oscillating saw was used to make the distal femoral cut, which was  flat. The pin and the block were removed.     The external alignment device was placed on the leg, and I measured  9 mm off the more prominent lateral tibial plateau. The 3 degree cutting block  was secured with two pins. I verified the alignment with the alignment  olga.  I also verified the proposed resection, taking about 4 mm medially  and 7 to 9 mm laterally. The PCL was protected. The tibial condyle was removed and estimated as a  Size 5 on the back table. I then sized the femur anteriorly to  posteriorly. I marked the rotation at 3 degrees. I secured  the jig in place and used the stylus to verify a size 5 anterior to  posterior dimension. I then assessed medial to lateral and verified a  size 5 implant. I removed the stylus and the sizing jig. I then removed  the pins. I then tapped the 4-in-1 cutting block into place and secured  it with two pins. Anterior and posterior cuts were verified with the blade  runner. An oscillating saw was then used to make the anterior and  posterior condyle cuts, followed by anterior and posterior chamfer  cuts. I then  placed a size 5 left CR femur into position, tapping it down line to  line. I then floated a size 5 tibial baseplate with a 9 mm insert. It did  reduce in flexion and was stable. It did allow the knee to come out  to full extension.     With the patella everted, I measured the thickness at 24 mm. I  resected 10 mm down to 14 mm remaining, and then sized the patella  as a 36 mm patella. I drilled the three lug holes to accept the trial. I  marked the rotation of the tibial baseplate and cycled the knee from full  extension to 115 degrees of flexion. The insert and knee were stable in  flexion and extension. I drilled the femoral lug holes. I then removed the trial. I pulse lavaged the  knee and removed the femoral trial and the patellar trial. With the tibia  exposed, I secured it with four pins. I placed the tower for the keel  punch. I then used the keel punch to prepare the tibial metaphysis for  a size 5. I then removed the keel punch, tibial baseplate, and pins. We verified the implants and brought them onto the field. I did pulse  lavage the tibia and suctioned it dry likewise posteriorly.  With the  retractors removed, I brought the knee out to full extension, and pulse  lavaged anteriorly and into the suprapatellar pouch and medial and  lateral gutters. Once the knee was suctioned dry, I did flex the knee  again and injected the Sang's solutioon for postoperative pain relief. We then, with the two  bags of antibiotic cement, cemented the tibial component, impacted it,  and removed excess cement. I cemented the femoral component,  impacted it, and removed excess cement. I floated the 9 mm trial and  size 5 insert, and reduced the knee in flexion. I brought the knee out to  full extension. I then cemented the patella and secured it with a  clamp. I removed excess cement. Once all cement had hardened and  cooled, I reassessed range of motion and stability from full extension to  115 degrees of flexion.     I felt the 9 insert was appropriate. We removed the trial 9. I pulse  lavaged the knee joint and then placed the 9 mm size 5 CR insert. I  locked it appropriately and verified the locking mechanism. I then  pulsed the knee again from the medial gutter to the lateral gutter and  suprapatellar pouch, suctioning dry. I then closed the medial  arthrotomy with two figure-of-eight 0-Vicryl sutures. I ranged the knee,  and the patella tracked appropriately, with no undue tension on the  patellofemoral joint repair. The remainder of the patellofemoral joint  was closed with interrupted figure-of-eight 0-Vicryl sutures, followed by  2-0 Vicryl sutures in the subcutaneous tissues and staples in the skin. A sterile compressive dressing of Adaptic, 4 x 4 gauze, sterile Webril,  Kerlix, and an Ace wrap was applied. The tourniquet was deflated, for a total tourniquet time of approximately 90  minutes. The patient was placed in a knee immobilizer.    The patient was transferred to the recovery room in [facility] .        Junior Beauchamp MD  1/29/2018  9:59 AM

## 2018-01-29 NOTE — BRIEF OP NOTE
BRIEF OPERATIVE NOTE    Date of Procedure: 1/29/2018   Preoperative Diagnosis: m17.12  Postoperative Diagnosis: m17.12    Procedure(s):  LEFT TOTAL KNEE REPLACEMENT WITH FRANCO, intraarticular joint inject Dr. Peña jacob  Surgeon(s) and Role:     * Mendez Jennings MD - Primary         Assistant Staff: None      Surgical Staff:  Circ-1: Naga Hoyt RN  Scrub Tech-1: Ijeoma Bees  Surg Asst-1: Ramirez Book  Surg Asst-2: Thi Marina  Event Time In   Incision Start 8177   Incision Close 0944     Anesthesia: General   Estimated Blood Loss: 25cc  Specimens: * No specimens in log *   Findings: osteophytes and full thickness cartilage erosions   Complications: none noted  Implants:   Implant Name Type Inv.  Item Serial No.  Lot No. LRB No. Used Action   CEMENT BNE SIMPLEX TOBRA 4 --  - SN/A  CEMENT BNE SIMPLEX TOBRA 4 --  N/A [employer] HOW [id number] Left 2 Implanted   PAT SYM TRIATHLN X3 [id number] -- TRIATHLON SYMMETRIC X3 - SN/A  PAT SYM TRIATHLN X3 [id number] -- TRIATHLON SYMMETRIC X3 N/A [employer] HOW M22P Left 1 Implanted   Primary Tibial Baseplate   N/A FRANCO JANEEN CL92R Left 1 Implanted   FEM KNE BARBI CR SZ 5 LT -- TRIATHLON - SN/A  FEM KNE BARBI CR SZ 5 LT -- TRIATHLON N/A [employer] HOW [id number] Left 1 Implanted   INSERT TIB CR TRIATHLN X3 5 9M --  - SN/A   INSERT TIB CR TRIATHLN X3 5 9M --  N/A [employer] HOW [id number] Left 1 Implanted     TTT daniel 90 min

## 2018-01-29 NOTE — CONSULTS
[facility]  Hospitalist Division    Consult Note    Patient: David Xie MRN: [id number]  CSN: [id number]    YOB: 1960  Age: 59 y.o. Sex: male    DOA: 1/29/2018 LOS:  LOS: 0 days        Requesting Physician: Dr. Leann Chaney   Reason for Consultation:  Medical Management    Chief Complaint:  Left knee osteoarthritis     Assessment/Plan     Patient Active Problem List   Diagnosis Code    Osteoarthritis of left knee M17.12    HTN (hypertension) I10    DM (diabetes mellitus) ([address] E11.9    HARJIT (obstructive sleep apnea) G47.33    HLD (hyperlipidemia) E78.5       A/P:  - S/p Left total knee arthroplasty: pain management and mobilization per Ortho  - HTN: amlodipine, HCTZ, Lisinopril   - DM: SSI and Levemir; diabetic diet   - Hypercholesterolemia: atorvastatin   - HARJIT: Cpap   - DVT prophylaxis: Lovenox   -We appreciate the consultation for medical management and appreciate being able to be involved with their care during hospitalization. HPI:     David Xie is a 59 y.o. male with a hx of HTN, HARJIT, DM-II, hypercholesterolemia and osteoarthritis who underwent Left total knee arthroplasty. Patient with greater than 5 year history of intermittent aching Left knee pain. Patient reports knee pain increased to constant aching in the past 6 months. Patient denies previous medical history to include CVA, TIA, MI, CAD or thyroid disorders. Hospitalist Service is consulted for ongoing medical management. Past Medical History:   Diagnosis Date    Diabetes ([address]     Hypertension     Sleep apnea     CPAP       Past Surgical History:   Procedure Laterality Date    HX KNEE REPLACEMENT  2015    Right       History reviewed. No pertinent family history.     Social History     Social History    Marital status:      Spouse name: N/A    Number of children: N/A    Years of education: N/A     Social History Main Topics    Smoking status: Never Smoker    Smokeless tobacco: Never Used    Alcohol use No    Drug use: No    Sexual activity: Not Asked     Other Topics Concern    None     Social History Narrative       Prior to Admission medications    Medication Sig Start Date End Date Taking? Authorizing Provider   amLODIPine (NORVASC) 10 mg tablet Take 10 mg by mouth daily. 12/21/17  Yes Historical Provider   atorvastatin (LIPITOR) 20 mg tablet Take 20 mg by mouth daily. 12/11/17  Yes Historical Provider   hydroCHLOROthiazide (HYDRODIURIL) 25 mg tablet Take 12.5 mg by mouth daily. 12/11/17  Yes Historical Provider   LANTUS SOLOSTAR 100 unit/mL (3 mL) inpn 36 Units by SubCUTAneous route nightly. 11/28/17  Yes Historical Provider   lisinopril (PRINIVIL, ZESTRIL) 40 mg tablet Take 40 mg by mouth daily. 11/27/17  Yes Historical Provider   JANUVIA 100 mg tablet Take 100 mg by mouth daily. 12/22/17  Yes Historical Provider   metFORMIN (GLUCOPHAGE) 500 mg tablet Take 500 mg by mouth two (2) times daily (with meals). Yes Historical Provider   aspirin delayed-release 81 mg tablet Take 81 mg by mouth daily.    Yes Historical Provider       No Known Allergies    Review of Systems  - fever, - chills, - fatigue, - weight loss, - night sweats   - sore throat, - sinus congestion, - lymphadenopathy, - vision changes  - CP, -  palpitations  - dyspnea on exertion, - dyspnea at rest, - cough, - hemoptysis  - nausea, - vomiting, - diarrhea, - abdominal pain, - reflux, - dysphagia  - dysuria, - hematuria, - urinary frequency  - rash, - pruritis  - back pain, - neck pain, - myalgia, + arthralgia  - H/A, - numbness, - tingling, - weakness, - slurred speech    Physical Exam:      Visit Vitals    /74 (BP 1 Location: Left arm, BP Patient Position: At rest)    Pulse 68    Temp 97.8 °F (36.6 °C)    Resp 20    Ht 5' 10\" (1.778 m)    Wt 99.8 kg (220 lb)    SpO2 96%    BMI 31.57 kg/m2       Physical Exam:  Gen: In general, this is a well nourished male in no acute distress on room air.  HEENT: Sclerae nonicteric. Oral mucous membranes moist.    Neck: Supple with midline trachea. CV: RRR without murmur or rub appreciated. Resp:Respirations are unlabored without use of accessory muscles. Lung fields bilaterally without wheezes or rhonchi. Abd: Soft, nontender, nondistended. Normoactive bowel sounds. Extrem: Extremities are warm, without cyanosis or clubbing. No pitting pretibial edema. Palpable distal pulses X 4. Left LE ace dressing CDI. Skin: Warm, no visible rashes. Neuro: Patient is alert, oriented, and cooperative. No obvious focal defects. Moves all 4 extremities.     Labs Reviewed:    Recent Results (from the past 24 hour(s))   PROTHROMBIN TIME + INR    Collection Time: 01/29/18  7:09 AM   Result Value Ref Range    Prothrombin time 12.0 11.5 - 15.2 sec    INR 0.9 0.8 - 1.2     PTT    Collection Time: 01/29/18  7:09 AM   Result Value Ref Range    aPTT 23.5 23.0 - 36.4 SEC   GLUCOSE, POC    Collection Time: 01/29/18  7:33 AM   Result Value Ref Range    Glucose (POC) 117 (H) 70 - 110 mg/dL   GLUCOSE, POC    Collection Time: 01/29/18  9:57 AM   Result Value Ref Range    Glucose (POC) 155 (H) 70 - 490 mg/dL   METABOLIC PANEL, BASIC    Collection Time: 01/29/18 10:40 AM   Result Value Ref Range    Sodium 142 136 - 145 mmol/L    Potassium 3.1 (L) 3.5 - 5.5 mmol/L    Chloride 105 100 - 108 mmol/L    CO2 26 21 - 32 mmol/L    Anion gap 11 3.0 - 18 mmol/L    Glucose 185 (H) 74 - 99 mg/dL    BUN 17 7.0 - 18 MG/DL    Creatinine 0.86 0.6 - 1.3 MG/DL    BUN/Creatinine ratio 20 12 - 20      GFR est AA >60 >60 ml/min/1.73m2    GFR est non-AA >60 >60 ml/min/1.73m2    Calcium 8.5 8.5 - 10.1 MG/DL   HGB & HCT    Collection Time: 01/29/18 10:40 AM   Result Value Ref Range    HGB 13.1 13.0 - 16.0 g/dL    HCT 39.2 36.0 - 48.0 %   HEMOGLOBIN A1C WITH EAG    Collection Time: 01/29/18 10:40 AM   Result Value Ref Range    Hemoglobin A1c 5.8 (H) 4.2 - 5.6 %    Est. average glucose 120 mg/dL Osmani Ledezma, [facility]  Hospitalist Division  Pager:  [phone]  Office:  [phone]

## 2018-01-29 NOTE — PROGRESS NOTES
Problem: Mobility Impaired (Adult and Pediatric)  Goal: *Acute Goals and Plan of Care (Insert Text)  PHYSICAL THERAPY SHORT TERM GOALS :   1.  Patient will perform/complete all bed mobility with supervision/set-up within 1 week(s). 2.  Patient will perform/complete all transfers  with modified independence within 1 week(s). 3.  Patient will ambulate 300 ft with LRAD with modified independence within 1 week(s). 4.  Patient will ascend/desend 5 stairs with S HR  with modified independence within 1 week(s). Therapist Yocasta Campbell  1/29/2018   Time Calculation: 30 mins  Outcome: Progressing Towards Goal  physical Therapy EVALUATION    Patient: Tayo Sands (89 y.o. male)  Date: 1/29/2018  Primary Diagnosis: m17.12  Osteoarthritis of left knee  Procedure(s) (LRB):  LEFT TOTAL KNEE REPLACEMENT WITH FRANCO, intraarticular joint inject Dr. Garnica Class solution (Left) Day of Surgery   Precautions:  Fall, WBAT (L LE)   PLOF: I with ADLs and ambulation using straight cane or walker, dependent upon \"knee stiffness\"     ASSESSMENT :  Patient supine in bed, family and visitors presents. Agreeable to participation with PT. Patient requires minimal assistance/contact guard assist for bed mobility, transfers and ambulation. Patient able to sit EOB with Min A. Demonstrated  R LE strength 5/5. L knee AROM knee flexion: 80 degrees, R knee AROM flexion: 134. Patient ambulated 10 ft in room with CGA and rolling walker. Patient denies \"pain\" in L knee but states \"it's more like discomfort\"; reports pain on dorsal surface of R hand proximal to IV insertion. Patient educated on plan of care, ankle pumps, gait technique with RW, and safety with functional mobility. Verbalized and demonstrated understanding. Needs reinforcement. Patient presents with deficits in:   Bed Mobility, Transfers, Gait, Strength, Range of Motion and Stairs    Patient will benefit from skilled intervention to address the above impairments.   Patients rehabilitation potential is considered to be Fair  Factors which may influence rehabilitation potential include:   []         None noted  []         Mental ability/status  [x]         Medical condition  []         Home/family situation and support systems  []         Safety awareness  [x]         Pain tolerance/management  []         Other:     Written on communication board: Therapy times, Assist x 1 with Walker        PLAN :  Recommendations and Planned Interventions:  [x]           Bed Mobility Training             [x]    Neuromuscular Re-Education  [x]           Transfer Training                   []    Orthotic/Prosthetic Training  [x]           Gait Training                          [x]    Modalities  [x]           Therapeutic Exercises          []    Edema Management/Control  [x]           Therapeutic Activities            [x]    Patient and Family Training/Education  []           Other (comment):    Frequency/Duration: Patient will be followed by physical therapy 1-2 times per day/4-7 days per week to address goals. Discharge Recommendations: Home Health  Further Equipment Recommendations for Discharge: rolling walker (already has)     SUBJECTIVE:   Patient stated It feels okay, better than the last time I got a knee done.     OBJECTIVE DATA SUMMARY:     Past Medical History:   Diagnosis Date    [address] [address]     Hypertension     Sleep apnea     CPAP     Past Surgical History:   Procedure Laterality Date    HX KNEE REPLACEMENT  2015    Right     Barriers to Learning/Limitations: yes;  none  Compensate with: visual, verbal, tactile, kinesthetic cues/model    G CODE:Mobility  Current  CJ= 20-39%   Goal  CI= 1-19%.   The severity rating is based on the Other [facility] Sitting Balance Scale 3+/5    Eval Complexity: History: MEDIUM  Complexity : 1-2 comorbidities / personal factors will impact the outcome/ POC Exam:HIGH Complexity : 4+ Standardized tests and measures addressing body structure, function, activity limitation and / or participation in recreation  Presentation: MEDIUM Complexity : Evolving with changing characteristics  Clinical Decision Making:Medium Complexity [facility] sitting balance scae Overall Complexity:MEDIUM    [address] Sitting Balance Scale 3+/5  0: Pt performs 25% or less of sitting activity (Max assist) CN, 100% impaired. 1: Pt supports self with upper extremities but requires therapist assistance. Pt performs 25-50% of effort (Mod assist) CM, 80% to <100% impaired. 1+: Pt supports self with upper extremities but requires therapist assistance. Pt performs >50% effort. (Min assist). CL, 60% to <80% impaired. 2: Pt supports self independently with both upper extremities. CL, 60% to <80% impaired. 2+: Pt support self independently with 1 upper extremity. CK, 40% to <60% impaired. 3: Pt sits without upper extremity support for up to 30 seconds. CK, 40% to <60% impaired. 3+: Pt sits without upper extremity support for 30 seconds or greater. CJ, 20% to <40% impaired. 4: Pt moves and returns trunkal midpoint 1-2 inches in one plane. CJ, 20% to <40% impaired. 4+: Pt moves and returns trunkal midpoint 1-2 inches in multiple planes. CI, 1% to <20% impaired. 5: Pt moves and returns trunkal midpoint in all planes greater than 2 inches. CH, 0% impaired. Prior Level of Function/Home Situation:   Home Situation  Home Environment: Private residence  # Steps to Enter: 5  Rails to Enter: Yes  Hand Rails : Bilateral (Too far to reach bilaterally)  One/Two Story Residence: Two story  # of Interior Steps: 15  Living Alone: No  Support Systems: Spouse/Significant Other/Partner  Patient Expects to be Discharged to[de-identified] Private residence  Current DME Used/Available at Home: Walker, rolling, Cane, straight  Tub or Shower Type: Shower  Critical Behavior:  Neurologic State: Alert  Orientation Level: Oriented X4  Cognition: Appropriate decision making; Appropriate safety awareness; Follows commands  Safety/Judgement: Awareness of environment  Psychosocial  Patient Behaviors: Calm; Cooperative  Family  Behaviors: Appropriate for situation; Cooperative;Calm  Visitor Behaviors: Cooperative;Calm  Purposeful Interaction: Yes  Strength:    Strength: Within functional limits (R LE 5/5)  Tone & Sensation:   Tone: Normal  Sensation: Intact  Range Of Motion:  AROM: Generally decreased, functional (B UE: WNL, L LE: 80, R  )  Functional Mobility:  Bed Mobility:  Rolling: Contact guard assistance  Supine to Sit: Contact guard assistance  Sit to Supine: Contact guard assistance  Scooting: Contact guard assistance  Transfers:  Sit to Stand: Minimum assistance;Assist x2 (Pt requested wife assist)  Stand to Sit: Contact guard assistance  Balance:   Sitting: Intact  Standing: Intact  Ambulation/Gait Training:  Distance (ft): 10 Feet (ft)  Assistive Device: Walker, rolling  Ambulation - Level of Assistance: Assist x1;Contact guard assistance  Gait Description (WDL): Exceptions to WDL  Gait Abnormalities: Antalgic  Right Side Weight Bearing: As tolerated (L LE)  Base of Support: Center of gravity altered  Stance: Left decreased  Speed/Juana: Delayed  Step Length: Right shortened  Swing Pattern: Right asymmetrical  Therapeutic Exercises: Ankle pumps  Pain: Denies pain, states discomfort in L knee. Pain in dorsal surface of R hand proximal to IV insertion   Activity Tolerance:   Fair  Please refer to the flowsheet for vital signs taken during this treatment. After treatment:   [x]         Patient left in no apparent distress sitting up in chair  []         Patient left in no apparent distress in bed  [x]         Call bell left within reach  [x]         Nursing notified  []         Caregiver present  []         Bed alarm activated    COMMUNICATION/EDUCATION:Verbalized and demonstrated understanding. Needs reinforcement.     [x]         Fall prevention education was provided and the patient/caregiver indicated understanding. [x]         Patient/family have participated as able in goal setting and plan of care. [x]         Patient/family agree to work toward stated goals and plan of care. []         Patient understands intent and goals of therapy, but is neutral about his/her participation. []         Patient is unable to participate in goal setting and plan of care.     Thank you for this referral.  Francisca Barnes   Time Calculation: 30 mins

## 2018-01-29 NOTE — PROGRESS NOTES
1334: Pt resting in bed. Pt states he will be dc'd home tomorrow with [facility]. Pt states he would like to use [facility] - INPATIENT. Pt has family to assist him. Pt states he has rolling walker for home use. Pt received post-op packet. Discussed use of IS, ankle pumps, hydration, and pain control. Pt denies needs at this time. Family at bedside. 1340: SOFIE López notified of dc plan and need for [facility] orders. \    1400: TOMMY Rich, PT notified of dc plan and need for PT eval.

## 2018-01-29 NOTE — ANESTHESIA PREPROCEDURE EVALUATION
Anesthetic History   No history of anesthetic complications            Review of Systems / Medical History  Patient summary reviewed and pertinent labs reviewed    Pulmonary        Sleep apnea: CPAP           Neuro/Psych   Within defined limits           Cardiovascular    Hypertension: well controlled                   GI/Hepatic/Renal  Within defined limits              Endo/Other    Diabetes: well controlled, type 2    Obesity     Other Findings   Comments: Documentation of current medication  Current medications obtained, documented and obtained? YES      Risk Factors for Postoperative nausea/vomiting:       History of postoperative nausea/vomiting? NO       Female? NO       Motion sickness? NO       Intended opioid administration for postoperative analgesia? YES      Smoking Abstinence:  Current Smoker? NO  Elective Surgery? YES  Seen preoperatively by anesthesiologist or proxy prior to day of surgery? YES  Pt abstained from smoking 24 hours prior to anesthesia?  N/A    Preventive care/screening for High Blood Pressure:  Aged 18 years and older: YES  Screened for high blood pressure: YES  Patients with high blood pressure referred to primary care provider   for BP management: YES                     Physical Exam    Airway  Mallampati: II  TM Distance: 4 - 6 cm  Neck ROM: normal range of motion   Mouth opening: Normal     Cardiovascular               Dental  No notable dental hx       Pulmonary                 Abdominal  GI exam deferred       Other Findings            Anesthetic Plan    ASA: 3  Anesthesia type: general          Induction: Intravenous  Anesthetic plan and risks discussed with: Patient

## 2018-01-29 NOTE — H&P
Date of Surgery Update: Margaret Ching was seen and examined. There have been no significant clinical changes since the completion of the originally dated History and Physical.    Original H&P to be scanned into system. Risks, benefits and alternatives discussed with mr mccracken to his satisfaction and he confirms he desires to proceed with left tka. He has no further questions. .    Signed By: Nataliya Estrella MD     January 29, 2018 7:19 AM

## 2018-01-29 NOTE — PERIOP NOTES
TRANSFER - OUT REPORT:    Verbal report given to twin wolf(name) on Raciel  being transferred to 2223(unit) for routine post - op       Report consisted of patients Situation, Background, Assessment and   Recommendations(SBAR). Information from the following report(s) SBAR, OR Summary, Intake/Output and MAR was reviewed with the receiving nurse. Lines:   Peripheral IV 01/29/18 Right Wrist (Active)   Site Assessment Clean, dry, & intact 1/29/2018 10:00 AM   Phlebitis Assessment 0 1/29/2018 10:00 AM   Infiltration Assessment 0 1/29/2018 10:00 AM   Dressing Status Clean, dry, & intact 1/29/2018 10:00 AM   Dressing Type Transparent;Tape 1/29/2018 10:00 AM   Hub Color/Line Status Infusing;Pink 1/29/2018 10:00 AM   Alcohol Cap Used Yes 1/29/2018  7:17 AM        Opportunity for questions and clarification was provided.       Patient transported with:   [id number]

## 2018-01-30 LAB
GLUCOSE BLD STRIP.AUTO-MCNC: 121 MG/DL (ref 70–110)
GLUCOSE BLD STRIP.AUTO-MCNC: 141 MG/DL (ref 70–110)
GLUCOSE BLD STRIP.AUTO-MCNC: 172 MG/DL (ref 70–110)
GLUCOSE BLD STRIP.AUTO-MCNC: 199 MG/DL (ref 70–110)
HCT VFR BLD AUTO: 36.1 % (ref 36–48)
HGB BLD-MCNC: 12.1 G/DL (ref 13–16)
POTASSIUM SERPL-SCNC: 3.5 MMOL/L (ref 3.5–5.5)

## 2018-01-30 PROCEDURE — 74011636637 HC RX REV CODE- 636/637: Performed by: NURSE PRACTITIONER

## 2018-01-30 PROCEDURE — 97530 THERAPEUTIC ACTIVITIES: CPT

## 2018-01-30 PROCEDURE — 65270000029 HC RM PRIVATE

## 2018-01-30 PROCEDURE — 36415 COLL VENOUS BLD VENIPUNCTURE: CPT | Performed by: ORTHOPAEDIC SURGERY

## 2018-01-30 PROCEDURE — 74011250637 HC RX REV CODE- 250/637: Performed by: PHYSICIAN ASSISTANT

## 2018-01-30 PROCEDURE — 85018 HEMOGLOBIN: CPT | Performed by: ORTHOPAEDIC SURGERY

## 2018-01-30 PROCEDURE — 74011250636 HC RX REV CODE- 250/636: Performed by: NURSE PRACTITIONER

## 2018-01-30 PROCEDURE — 77030020255 HC SOL INJ LR 1000ML BG

## 2018-01-30 PROCEDURE — 74011250636 HC RX REV CODE- 250/636: Performed by: ORTHOPAEDIC SURGERY

## 2018-01-30 PROCEDURE — 82962 GLUCOSE BLOOD TEST: CPT

## 2018-01-30 PROCEDURE — 97165 OT EVAL LOW COMPLEX 30 MIN: CPT

## 2018-01-30 PROCEDURE — 74011250637 HC RX REV CODE- 250/637: Performed by: ORTHOPAEDIC SURGERY

## 2018-01-30 PROCEDURE — 84132 ASSAY OF SERUM POTASSIUM: CPT | Performed by: HOSPITALIST

## 2018-01-30 RX ORDER — OXYCODONE HYDROCHLORIDE 5 MG/1
5-10 TABLET ORAL
Status: DISCONTINUED | OUTPATIENT
Start: 2018-01-30 | End: 2018-01-31

## 2018-01-30 RX ORDER — ENOXAPARIN SODIUM 100 MG/ML
40 INJECTION SUBCUTANEOUS EVERY 24 HOURS
Qty: 14 SYRINGE | Refills: 0 | Status: SHIPPED | OUTPATIENT
Start: 2018-01-30

## 2018-01-30 RX ORDER — OXYCODONE HYDROCHLORIDE 5 MG/1
5-10 TABLET ORAL
Qty: 60 TAB | Refills: 0 | Status: SHIPPED | OUTPATIENT
Start: 2018-01-30 | End: 2018-01-31

## 2018-01-30 RX ORDER — MORPHINE SULFATE 2 MG/ML
2-4 INJECTION, SOLUTION INTRAMUSCULAR; INTRAVENOUS
Status: DISCONTINUED | OUTPATIENT
Start: 2018-01-30 | End: 2018-01-31 | Stop reason: HOSPADM

## 2018-01-30 RX ADMIN — MORPHINE SULFATE 2 MG: 2 INJECTION, SOLUTION INTRAMUSCULAR; INTRAVENOUS at 09:49

## 2018-01-30 RX ADMIN — OXYCODONE HYDROCHLORIDE 5 MG: 5 TABLET ORAL at 02:18

## 2018-01-30 RX ADMIN — MORPHINE SULFATE 2 MG: 2 INJECTION, SOLUTION INTRAMUSCULAR; INTRAVENOUS at 05:38

## 2018-01-30 RX ADMIN — MORPHINE SULFATE 2 MG: 2 INJECTION, SOLUTION INTRAMUSCULAR; INTRAVENOUS at 17:26

## 2018-01-30 RX ADMIN — OXYCODONE HYDROCHLORIDE 5 MG: 5 TABLET ORAL at 06:41

## 2018-01-30 RX ADMIN — ATORVASTATIN CALCIUM 20 MG: 20 TABLET, FILM COATED ORAL at 09:50

## 2018-01-30 RX ADMIN — Medication 10 ML: at 17:16

## 2018-01-30 RX ADMIN — ASPIRIN 81 MG: 81 TABLET, COATED ORAL at 10:02

## 2018-01-30 RX ADMIN — INSULIN DETEMIR 30 UNITS: 100 INJECTION, SOLUTION SUBCUTANEOUS at 21:38

## 2018-01-30 RX ADMIN — Medication 10 ML: at 07:34

## 2018-01-30 RX ADMIN — MORPHINE SULFATE 2 MG: 2 INJECTION, SOLUTION INTRAMUSCULAR; INTRAVENOUS at 21:36

## 2018-01-30 RX ADMIN — Medication 10 ML: at 21:39

## 2018-01-30 RX ADMIN — HYDROCHLOROTHIAZIDE 12.5 MG: 25 TABLET ORAL at 09:49

## 2018-01-30 RX ADMIN — DOCUSATE SODIUM 100 MG: 100 CAPSULE, LIQUID FILLED ORAL at 17:14

## 2018-01-30 RX ADMIN — MORPHINE SULFATE 2 MG: 2 INJECTION, SOLUTION INTRAMUSCULAR; INTRAVENOUS at 22:32

## 2018-01-30 RX ADMIN — AMLODIPINE BESYLATE 10 MG: 10 TABLET ORAL at 09:50

## 2018-01-30 RX ADMIN — MORPHINE SULFATE 2 MG: 2 INJECTION, SOLUTION INTRAMUSCULAR; INTRAVENOUS at 07:33

## 2018-01-30 RX ADMIN — DOCUSATE SODIUM 100 MG: 100 CAPSULE, LIQUID FILLED ORAL at 09:49

## 2018-01-30 RX ADMIN — SODIUM CHLORIDE, SODIUM LACTATE, POTASSIUM CHLORIDE, AND CALCIUM CHLORIDE 75 ML/HR: 600; 310; 30; 20 INJECTION, SOLUTION INTRAVENOUS at 05:53

## 2018-01-30 RX ADMIN — OXYCODONE HYDROCHLORIDE 10 MG: 5 TABLET ORAL at 20:03

## 2018-01-30 RX ADMIN — LISINOPRIL 40 MG: 40 TABLET ORAL at 09:50

## 2018-01-30 RX ADMIN — CEFAZOLIN SODIUM 2 G: 2 SOLUTION INTRAVENOUS at 00:20

## 2018-01-30 RX ADMIN — ENOXAPARIN SODIUM 40 MG: 40 INJECTION SUBCUTANEOUS at 17:14

## 2018-01-30 RX ADMIN — OXYCODONE HYDROCHLORIDE 10 MG: 5 TABLET ORAL at 13:48

## 2018-01-30 NOTE — PROGRESS NOTES
Pt OOB in chair. Pt states pain is not controlled. Spouse at bedside. Pt plans for dc home with [facility] tomorrow due to increased pain. Discussed plan for PT BID and pain control today. Pt verbalized understanding.

## 2018-01-30 NOTE — PROGRESS NOTES
[facility]   Discharge Planning/ Assessment      Reasons for Intervention:  Interviewed patient, he agrees to share his discharge information with his spouse, see below. Demographic information verified. [facility] was independent prior to admission and sees Dr Marixa Chan care needs. Marybel Hannon is to return home with home care. He states he has a walker and an elevated toilet seat.  Francoise Hendrix  High Risk Criteria  [x] Yes                []No   Physician Referral  [] Yes                [x]No        Date      Nursing Referral  [] Yes                [x]No        Date      Patient/Family Request  [] Yes                [x]No        Date          Resources:      Medicare  [] Yes                [x]No   Medicaid  [] Yes                [x]No   No Resources  [] Yes                [x]No   Private Insurance  [x] Yes                []No blue cross    Name/Phone Number      Other  [] Yes                [x]No        (i.e. Workman's Comp)          Prior Services:      Prior Services [address] [facility]  [] Yes                [x]No        Number of [place]  [] Yes                [x]No         Meals on [address] [facility] [facility] on [address] [facility]  [] Yes                [x]No   ANNETTE [address] Name [facility]  [] Yes                [x]No        Rehab/VA Name      Other [address] obtained from [address]  [] Child  [] Spouse   [] Significant Other/Partner   [] Friend      [] EMS    [] Nursing Home Chart          [x] Other: chart   Chart Review  [x] Yes                []No       Family/Support System:      Patient lives with  [] Alone    [x] Spouse   [] Significant Other  [] Children  [] Caretaker   [] Parent  [] Sibling     [] Other        Other Support System:      Is the patient responsible for care of others  [] Yes                [x]No   Information of person caring for patient on  discharge self   Managers financial affairs independently  [x] Yes                []No   If no, explain:          Status Prior to Admission:      Mental Status  [x] Awake  [x] Alert  [x] Oriented  [] Quiet/Calm [] Lethargic/Sedated   [] Disoriented  [] Restless/Anxious  [] Combative   [address]   Meal Preparation Ability  [x] Independent   [] Standby Assistance [address] [facility] with Luiz Corrales [address] Resident   [] Requires Assistance   Bowel/Bladder  [x] Continent  [] Catheter  [] Incontinent  [] Ostomy Self-Care    [] Urine Diversion Self-Care  [] Maximum Assistance     [] Total Assistance   Number of Persons needed for assistance      DME at home  [] Cane, Quad  [] Cane, Straight   [x] Commode    [] Bathroom/Grab Bars  [] Hospital Bed  [] Nebulizer  [] Oxygen           [] Raised Toilet Seat  [] Shower Chair  [] Side Rails for Bed   [] Tub Transfer Bench   [x] Walker, Rolling  [] Walker, Standard      [] Other:   Vendor          Treatment Presently Receiving:      Current Treatments  [] Chemotherapy  [] Dialysis  [] Insulin  [] IVAB [x] IVF   [] O2  [] PCA   [x] PT   [] RT   [] Tube Feedings   [] Wound Care       Psychosocial Evaluation:      Verbalized Knowledge of Disease Process  [x] Patient                       [x]Family   Coping with Disease Process  [x] Patient                       [x]Family   Requires Further Counseling Coping with Disease Process  [] Patient                       []Family       Identified Projected Needs:  [address] Aid  [] Yes                [x]No   Transportation  [] Yes                [x]No   Education  [] Yes                [x]No        Specific Education       Financial Counseling  [] Yes                [x]No   Inability to Care for Self/Will Require 24 hour care  [] Yes                [x]No   Pain Management  [] Yes                [x]No   Home Infusion Therapy  [] Yes                [x]No   Oxygen Therapy  [] Yes                [x]No   DME  [] Yes                [x]No   [address] Placement  [] Yes                [x]No   Rehab  [] Yes                [x]No   Physical Therapy  [x] Yes                []No   Needs Anticipated At This Time  [x] Yes                []No       Intra-Hospital Referral:  [address]  [x] Yes                []No     [] Yes                [x]No   Patient Representative  [] Yes                [x]No   Staff for Teaching Needs  [] Yes                [x]No   Specialty Teaching Needs      Diabetic Educator  [] Yes                [x]No   Referral for Diabetic Educator Needed  [] Yes                [x]No  If Yes, place order for Nutritionist or Diabetic Consult       Tentative Discharge Plan:  [address] [facility] with No Services  [] Yes                [x]No   Home with [address] [facility] [address]  [x] Yes                []No        If Yes, specify type Nursing/therpay     Home Care Program  [] Yes                [x]No        If Yes, specify type      Meals on Wheels  [] Yes                [x]No   Office of Aging  [] Yes                [x]No   NHP  [] Yes                [x]No   Return to the Nursing Home  [] Yes                [x]No   Rehab Therapy  [] Yes                [x]No   Acute Rehab  [] Yes                [x]No   Subacute Rehab  [] Yes                [x]No   Private Care  [] Yes                [x]No   Substance Abuse Referral  [] Yes                [x]No   Transportation  [] Yes                [x]No   Chore Service  [] Yes                [x]No   Inpatient Hospice  [] Yes                [x]No   OP RT  [] Yes                [x] No   OP Hemo  [] Yes                [x] No   OP PT  [x] Yes                [x]No   Support Group  [] Yes                [x]No   Reach to Recovery  [] Yes                [x]No   [address] Appointment  [] Yes                [x]No   DME  [] Yes                [x]No   Comments      Name of D/C Planner or  Given to Patient or Family Kj Vogel RN   Phone Number [phone] [place] [address]   Date Jan 30 2018   Time 656 am   If you are discharged home, whom do you designate to participate in your discharge plan and receive any information needed?       Enter name of Gibran Blackwell  spouse        Phone # of [id number] /[phone]        Address of [address] [place] [address], [place], [facility] [address]        Updated Jan 30 , 2018        Patient refused to designate any           individual Cm Triana

## 2018-01-30 NOTE — PROGRESS NOTES
Offered the pt FOC for home care, he chose [facility]. Pt verified the contact information on the face sheet is correct. DC pending. Care Management Interventions  PCP Verified by CM: Yes  Mode of Transport at Discharge: Other (see comment)  Transition of Care Consult (CM Consult): [address]: Yes  MyChart Signup: No  Discharge Durable Medical Equipment: No  Physical Therapy Consult: Yes  Occupational Therapy Consult: Yes  Speech Therapy Consult: No  Current Support Network:  Other  Confirm Follow Up Transport: Friends  Plan discussed with Pt/Family/Caregiver: Yes  Freedom of Choice Offered: Yes  Discharge Location  Discharge Placement: Home with home health

## 2018-01-30 NOTE — PROGRESS NOTES
Problem: Mobility Impaired (Adult and Pediatric)  Goal: *Acute Goals and Plan of Care (Insert Text)  PHYSICAL THERAPY SHORT TERM GOALS :   1.  Patient will perform/complete all bed mobility with supervision/set-up within 1 week(s). 2.  Patient will perform/complete all transfers  with modified independence within 1 week(s). 3.  Patient will ambulate 300 ft with LRAD with modified independence within 1 week(s). 4.  Patient will ascend/desend 5 stairs with S HR  with modified independence within 1 week(s). Therapist Sotero Barthel  1/29/2018   Time Calculation: 30 mins   Outcome: Progressing Towards Goal  physical Therapy TREATMENT    Patient: Veronica Chance (24 y.o. male)  Date: 1/30/2018  Diagnosis: m17.12  Osteoarthritis of left knee Osteoarthritis of left knee  Procedure(s) (LRB):  LEFT TOTAL KNEE REPLACEMENT WITH FRANCO, intraarticular joint inject Dr. Rae Merritt solution (Left) 1 Day Post-Op  Precautions: Fall, WBAT (LLE)   Chart, physical therapy assessment, plan of care and goals were reviewed. PLOF: independent, lives with spouse, no AD to ambulate, has a RW from prior surgery  ASSESSMENT:  Pt sitting in recliner upon entering room. Increased time and VC for UE placement for sit to stand. Ambulated 40ft with RW, step to gt pattern, multiple standing rest breaks, no LOB or knee buckling noted. Pt returned to sitting in recliner, (B)LE elevated on a 2nd chair to facilitate knee extension. Provided with HEP handout and reviewed exercises. Education: HEP, gt seq and RW mgmt, UE placement for sit<>stand  Progression toward goals:  []      Improving appropriately and progressing toward goals  [x]      Improving slowly and progressing toward goals  []      Not making progress toward goals and plan of care will be adjusted     PLAN:  Patient continues to benefit from skilled intervention to address the above impairments. Continue treatment per established plan of care.   Discharge Recommendations: Home Health  Further Equipment Recommendations for Discharge:  rolling walker     SUBJECTIVE:   Patient stated I am staying until tomorrow.     OBJECTIVE DATA SUMMARY:   Critical Behavior:  Neurologic State: Alert  Orientation Level: Oriented X4  Cognition: Appropriate decision making, Appropriate for age attention/concentration, Appropriate safety awareness, Follows commands  Safety/Judgement: Awareness of environment, Fall prevention  Functional Mobility Training:  Bed Mobility:  Supine to Sit: Moderate assistance; Additional time (for LLE management)  Sit to Supine:  (pt left up in recliner chair)  Transfers:  Sit to Stand: Contact guard assistance; Additional time  Stand to Sit: Contact guard assistance; Additional time  Bed to Chair: Contact guard assistance; Additional time  Balance:  Sitting: Intact  Standing: Impaired; With support  Standing - Static: Fair  Standing - Dynamic : Fair  Ambulation/Gait Training:  Distance (ft): 40 Feet (ft)  Assistive Device: Gait belt;Walker, rolling  Ambulation - Level of Assistance: Contact guard assistance  Gait Abnormalities: Antalgic;Decreased step clearance; Step to gait  Left Side Weight Bearing: As tolerated  Stance: Right increased  Speed/Juana: Slow;Delayed  Step Length: Right shortened  Swing Pattern: Left asymmetrical  Pain:  Pre:8  Post:8  Pain Scale 1: Numeric (0 - 10)    Pain Location 1: Knee  Pain Orientation 1: Left  Pain Description 1: Aching    Activity Tolerance: Fair(-)  Please refer to the flowsheet for vital signs taken during this treatment.   After treatment:   [x] Patient left in no apparent distress sitting up in chair  [] Patient left in no apparent distress in bed  [x] Call bell left within reach  [x] Nursing notified  [x] Caregiver present  [] Bed alarm activated      Ute Fontanez PTA   Time Calculation: 26 mins

## 2018-01-30 NOTE — PROGRESS NOTES
[facility]  Hospitalist Division        Inpatient Daily Progress Note    Daily progress Note    Patient: Odalys Serna MRN: [id number]  CSN: [id number]    YOB: 1960  Age: 59 y.o. Sex: male    DOA: 1/29/2018 LOS:  LOS: 1 day                    Chief Complaint:  S/p Left total knee arthroplasty     Subjective:      Sitting up in chair. Pain management continues to be an issue. + flatus. Objective:      Visit Vitals    /81 (BP 1 Location: Left arm, BP Patient Position: At rest)    Pulse 81    Temp 98 °F (36.7 °C)    Resp 20    Ht 5' 10\" (1.778 m)    Wt 99.8 kg (220 lb)    SpO2 98%    BMI 31.57 kg/m2         Physical Exam:  General appearance: alert, cooperative, no distress, appears stated age  Lungs: clear to auscultation bilaterally, no wheezes or rhonchi   Heart: regular rate and rhythm, S1, S2 normal, no murmur, click, rub or gallop  Abdomen: soft, non tender, non distended. Normoactive bowel sounds  Extremities: LLE ace dressing- CDI. BLE compartments soft, non tender   Skin: Skin color, texture, turgor normal. No rashes or lesions  Neurologic: Grossly normal  PSY: Mood and affect normal, appropriately behaved      Intake and Output:  Current Shift:  01/30 0701 - 01/30 1900  In: 1600 [P.O.:700;  I.V.:900]  Out: -   Last three shifts:  01/28 1901 - 01/30 0700  In: 2005 [P.O.:480; I.V.:1525]  Out: 1850 [Urine:1850]    Recent Results (from the past 24 hour(s))   GLUCOSE, POC    Collection Time: 01/29/18  9:57 AM   Result Value Ref Range    Glucose (POC) 155 (H) 70 - 082 mg/dL   METABOLIC PANEL, BASIC    Collection Time: 01/29/18 10:40 AM   Result Value Ref Range    Sodium 142 136 - 145 mmol/L    Potassium 3.1 (L) 3.5 - 5.5 mmol/L    Chloride 105 100 - 108 mmol/L    CO2 26 21 - 32 mmol/L    Anion gap 11 3.0 - 18 mmol/L    Glucose 185 (H) 74 - 99 mg/dL    BUN 17 7.0 - 18 MG/DL    Creatinine 0.86 0.6 - 1.3 MG/DL    BUN/Creatinine ratio 20 12 - 20 GFR est AA >60 >60 ml/min/1.73m2    GFR est non-AA >60 >60 ml/min/1.73m2    Calcium 8.5 8.5 - 10.1 MG/DL   HGB & HCT    Collection Time: 01/29/18 10:40 AM   Result Value Ref Range    HGB 13.1 13.0 - 16.0 g/dL    HCT 39.2 36.0 - 48.0 %   HEMOGLOBIN A1C WITH EAG    Collection Time: 01/29/18 10:40 AM   Result Value Ref Range    Hemoglobin A1c 5.8 (H) 4.2 - 5.6 %    Est. average glucose 120 mg/dL   GLUCOSE, POC    Collection Time: 01/29/18  4:57 PM   Result Value Ref Range    Glucose (POC) 155 (H) 70 - 110 mg/dL   GLUCOSE, POC    Collection Time: 01/29/18  9:19 PM   Result Value Ref Range    Glucose (POC) 146 (H) 70 - 110 mg/dL   HGB & HCT    Collection Time: 01/30/18  4:20 AM   Result Value Ref Range    HGB 12.1 (L) 13.0 - 16.0 g/dL    HCT 36.1 36.0 - 48.0 %   GLUCOSE, POC    Collection Time: 01/30/18  6:14 AM   Result Value Ref Range    Glucose (POC) 121 (H) 70 - 110 mg/dL           Lab Results   Component Value Date/Time    Glucose 185 01/29/2018 10:40 AM        Assessment/Plan:     Patient Active Problem List   Diagnosis Code    Osteoarthritis of left knee M17.12    HTN (hypertension) I10    DM (diabetes mellitus) (HCC) E11.9    HARJIT (obstructive sleep apnea) G47.33    HLD (hyperlipidemia) E78.5       A/P:  - S/p Left total knee arthroplasty: pain regimen adjusted per Ortho   - HTN: stable.  Continue amlodipine, HCTZ, Lisinopril   - DM: well controlled with SSI and Levemir; diabetic diet   - Hypercholesterolemia: atorvastatin   - HARJIT: Cpap   - DVT prophylaxis: Lovenox   - Continue to work with PT; encourage IS use         Gato Rao, FNP-PATRICIA [address]  Pager:  [phone]  Office:  [phone]

## 2018-01-30 NOTE — PROGRESS NOTES
Problem: Mobility Impaired (Adult and Pediatric)  Goal: *Acute Goals and Plan of Care (Insert Text)  PHYSICAL THERAPY SHORT TERM GOALS :   1.  Patient will perform/complete all bed mobility with supervision/set-up within 1 week(s). 2.  Patient will perform/complete all transfers  with modified independence within 1 week(s). 3.  Patient will ambulate 300 ft with LRAD with modified independence within 1 week(s). 4.  Patient will ascend/desend 5 stairs with S HR  with modified independence within 1 week(s). Therapist Drea De La Garza  1/29/2018   Time Calculation: 30 mins   Outcome: Progressing Towards Goal  physical Therapy TREATMENT    Patient: Tony Ybarra (16 y.o. male)  Date: 1/30/2018  Diagnosis: m17.12  Osteoarthritis of left knee Osteoarthritis of left knee  Procedure(s) (LRB):  LEFT TOTAL KNEE REPLACEMENT WITH FRANCO, intraarticular joint inject Dr. Sachin jacob (Left) 1 Day Post-Op  Precautions: Fall, WBAT (LLE)   Chart, physical therapy assessment, plan of care and goals were reviewed. PLOF:independent, ambulatory without AD  ASSESSMENT:  Pt sitting in recliner still from this a.m.session. Pt ambulated 60ft with RW, step to gt pattern, multiple standing rest breaks, no LOB or knee buckling noted. Pt performed seated HS at EOB prior to laying supine. Pt performed hook tech to assist LLE into bed. Education: stressed incentive spirometer use to reduce elevated temp  Progression toward goals:  []      Improving appropriately and progressing toward goals  [x]      Improving slowly and progressing toward goals  []      Not making progress toward goals and plan of care will be adjusted     PLAN:  Patient continues to benefit from skilled intervention to address the above impairments. Continue treatment per established plan of care.   Discharge Recommendations:  Home Health  Further Equipment Recommendations for Discharge:  rolling walker (pt has one)     SUBJECTIVE:   Patient stated It's the same.  (pain level)    OBJECTIVE DATA SUMMARY:   Critical Behavior:  Neurologic State: Alert  Orientation Level: Oriented X4  Cognition: Appropriate decision making, Appropriate for age attention/concentration, Appropriate safety awareness, Follows commands  Safety/Judgement: Awareness of environment, Fall prevention  Functional Mobility Training:  Bed Mobility:  Supine to Sit: Moderate assistance; Additional time (for LLE management)  Sit to Supine: Contact guard assistance;Minimum assistance  Scooting: Modified independent  Transfers:  Sit to Stand: Contact guard assistance; Additional time  Stand to Sit: Contact guard assistance; Additional time  Bed to Chair: Contact guard assistance; Additional time  Balance:  Sitting: Intact  Standing: With support  Standing - Static: Fair  Standing - Dynamic : Fair  Ambulation/Gait Training:  Distance (ft): 60 Feet (ft)  Assistive Device: Gait belt;Walker, rolling  Ambulation - Level of Assistance: Contact guard assistance  Gait Abnormalities: Antalgic;Decreased step clearance; Step to gait  Left Side Weight Bearing: As tolerated    Stance: Right increased  Speed/Juana: Slow;Delayed  Step Length: Right shortened  Swing Pattern: Left asymmetrical  Therapeutic Exercises:   Seated HS  Pain:  Pre:8  Post:8  Pain Scale 1: Numeric (0 - 10)    Pain Location 1: Knee  Pain Orientation 1: Left  Pain Description 1: Aching    Activity Tolerance:   Fair(-)  Please refer to the flowsheet for vital signs taken during this treatment.   After treatment:   [] Patient left in no apparent distress sitting up in chair  [x] Patient left in no apparent distress in bed  [x] Call bell left within reach  [x] Nursing notified  [] Caregiver present  [] Bed alarm activated      Janet Hensley PTA   Time Calculation: 27 mins

## 2018-01-30 NOTE — ROUTINE PROCESS
Bedside and Verbal shift change report given to Abhay Rubi (oncoming nurse) by Sakshi Emery   (offgoing nurse). Report included the following information SBAR, Kardex, Intake/Output and MAR.

## 2018-01-30 NOTE — PROGRESS NOTES
Problem: Self Care Deficits Care Plan (Adult)  Goal: *Acute Goals and Plan of Care (Insert Text)  Occupational Therapy Goals  Initiated 1/30/2018 within 7 day(s). 1.  Patient will perform grooming tasks while standing with modified independence. 2.  Patient will perform lower body dressing with modified independence utilizing AE, prn.  3.  Patient will perform functional task in standing for 8 minutes with modified independence and fair+ dynamic standing balance to increase activity tolerance for ADLs. 4.  Patient will perform toilet transfers with modified independence. 5.  Patient will perform all aspects of toileting with modified independence. 6.  Patient will participate in upper extremity therapeutic exercise/activities with modified independence for 8 minutes to maintain BUE strength for ADLs. 7.  Patient will utilize energy conservation techniques during functional activities with minimal verbal cues. Outcome: Progressing Towards Goal  Occupational Therapy EVALUATION    Patient: Severa Curet (2 y.o. male)  Date: 1/30/2018  Primary Diagnosis: m17.12  Osteoarthritis of left knee  Procedure(s) (LRB):  LEFT TOTAL KNEE REPLACEMENT WITH FRANCO, intraarticular joint inject Dr. Everett Booker solution (Left) 1 Day Post-Op   Precautions:  Fall, WBAT (LLE)  PLOF: Pt was independent with ADLs & mod I for functional mobility PTA. ASSESSMENT :  Based on the objective data described below, the patient presents with impairments with regard to bed mobility, activity tolerance and independence in ADLs secondary to L TKR. Pt reporting 10/10 pain but requesting to maneuver OOB in attempt to \"loosen up\" L knee. Mod A to maneuver to EOB d/t pain; intact sitting balance. CGA for functional transfers with RW in prep for toilet transfer. Pt left in recliner chair with needs within reach. Continues to report 10/10 pain. BLEs elevated. Spouse at bedside. Pt educated on role of OT, POC, and home safety.  Plan to address AE training on 1/31/2018. Camryn Knight RN notified of 10/10 pain. Patient will benefit from skilled intervention to address the above impairments. Patients rehabilitation potential is considered to be Good  Factors which may influence rehabilitation potential include:   []             None noted  []             Mental ability/status  [x]             Medical condition  []             Home/family situation and support systems  []             Safety awareness  []             Pain tolerance/management  []             Other:     Recommendations for nursing: up with assist x1 & RW  Written on communication board: yes  Verbally communicated to: Camryn Knight RN       PLAN :  Recommendations and Planned Interventions:  [x]               Self Care Training                  [x]        Therapeutic Activities  [x]               Functional Mobility Training    []        Cognitive Retraining  [x]               Therapeutic Exercises           [x]        Endurance Activities  [x]               Balance Training                   []        Neuromuscular Re-Education  []               Visual/Perceptual Training     [x]   Home Safety Training  [x]               Patient Education                 [x]        Family Training/Education  []               Other (comment):    Frequency/Duration: Patient will be followed by occupational therapy 4-7 times a week to address goals. Discharge Recommendations: Home Health  Further Equipment Recommendations for Discharge: shower chair     SUBJECTIVE:   Patient stated This pain is just horrible.     OBJECTIVE DATA SUMMARY:     Past Medical History:   Diagnosis Date    Diabetes ([facility] [address]     Hypertension     Sleep apnea     CPAP     Past Surgical History:   Procedure Laterality Date    HX KNEE REPLACEMENT  2015    Right     Barriers to Learning/Limitations: None  Compensate with: visual, verbal, tactile, kinesthetic cues/model    GCODES:  Self Care  Current  CL= 60-79%   Goal  CI= 1-19%.   The severity rating is based on the Other Functional Assessment, MMT, ROM    Eval Complexity: History: LOW Complexity : Brief history review ; Examination: LOW Complexity : 1-3 performance deficits relating to physical, cognitive , or psychosocial skils that result in activity limitations and / or participation restrictions ; Decision Making:LOW Complexity : No comorbidities that affect functional and no verbal or physical assistance needed to complete eval tasks     Prior Level of Function/Home Situation: Pt was independent with ADLs & mod I for functional mobility PTA. Home Situation  Home Environment: Private residence  # Steps to Enter: 5  Rails to Enter: Yes  Hand Rails : Bilateral (cannot reach both rails)  One/Two Story Residence: Two story  # of Interior Steps: 15  Living Alone: No  Support Systems: Spouse/Significant Other/Partner  Patient Expects to be Discharged to[de-identified] Private residence  Current DME Used/Available at Home: Raised toilet seat, Walker, rolling, Cane, straight  Tub or Shower Type: Shower (has small step in)  [x]  Right hand dominant   []  Left hand dominant    Cognitive/Behavioral Status:  Neurologic State: Alert  Orientation Level: Oriented X4  Cognition: Appropriate decision making; Appropriate for age attention/concentration; Appropriate safety awareness; Follows commands  Safety/Judgement: Awareness of environment; Fall prevention     Skin: Intact (BUEs)  Edema: None noted (BUEs)    Vision/Perceptual:    Acuity: Within Defined Limits      Coordination:  Coordination: Within functional limits (BUEs)  Fine Motor Skills-Upper: Right Intact; Left Intact    Gross Motor Skills-Upper: Right Intact; Left Intact     Balance:  Sitting: Intact  Standing: Impaired; With support  Standing - Static: Fair  Standing - Dynamic : Fair     Strength:  Strength:  Within functional limits (BUEs: 5/5)    Tone & Sensation:  Tone: Normal (BUEs)  Sensation: Intact (BUEs)    Range of Motion:  AROM: Within functional limits (BUEs: full shoulder/elbow flex)    Functional Mobility and Transfers for ADLs:  Bed Mobility:  Supine to Sit: Moderate assistance; Additional time (for LLE management)  Sit to Supine:  (pt left up in recliner chair)    Transfers:  Sit to Stand: Contact guard assistance  Bed to Chair: Contact guard assistance; Additional time    ADL Assessment:  Feeding: Modified independent  Oral Facial Hygiene/Grooming: Modified Independent  Bathing: Moderate assistance  Upper Body Dressing: Modified independent  Lower Body Dressing: Maximum assistance  Toileting: Minimum assistance    Cognitive Retraining  Safety/Judgement: Awareness of environment; Fall prevention    Therapeutic Activity:  Functional transfer from EOB to recliner chair in prep for maneuvering to bathroom & toilet transfer. Additional time required due to pain; skilled instruction on pacing & positioning of BUEs on RW for safety. Pain:  Pre treatment pain level: 10/10  Post treatment pain level: 10/10  Pain Scale 1: Numeric (0 - 10)    Activity Tolerance:  Fair  Please refer to the flowsheet for vital signs taken during this treatment. After treatment:   [x] Patient left in no apparent distress sitting up in chair  [] Patient left in no apparent distress in bed  [x] Call bell left within reach  [x] Nursing notified  [] Caregiver present  [] Bed alarm activated    COMMUNICATION/EDUCATION: Pt/spouse educated on role of OT, POC, and home safety. They verbalized understanding. [x] Home safety education was provided and the patient/caregiver indicated understanding. [x] Patient/family have participated as able in goal setting and plan of care. [x] Patient/family agree to work toward stated goals and plan of care. [] Patient understands intent and goals of therapy, but is neutral about his/her participation. [] Patient is unable to participate in goal setting and plan of care.     Thank you for this referral.    Roly Reid MS OTR/L  Time Calculation: 24 mins

## 2018-01-30 NOTE — PROGRESS NOTES
1921 Received patient from Farheen Marino. Patient is alert and oriented x 4.  2200 Patient asked to be assisted to the bathroom, then went and sat down for 1+ hour in recliner in room, then back to bed. He ambulated there on his own, with considerable difficulty. 0200 Patient is resting asleep using CPAP No distress noted. 9860 Patient called the nurses station stating that he is in excruciating pain, 8/10. Morphine PRN given IV. Recheck 20 minutes later, Patient says \"It feels the same. \" Mandatory hour in effect before another intervention can be given. 8287 Patient in too much discomfort to be ambulated. 0800 Wrote out report for Rock Aki RN. Included usual report sheet along with patient concerns.

## 2018-01-30 NOTE — PROGRESS NOTES
Patient has designated his spouse to participate in his/her discharge plan and to receive any needed information.      Name:   Jossy Oneill  spouse   /   [address] ([address] [address]) [id number]   Jan 30 , 2018     Address:  Phone number:

## 2018-01-31 ENCOUNTER — HOME HEALTH ADMISSION (OUTPATIENT)
Dept: HOME HEALTH SERVICES | Facility: HOME HEALTH | Age: 58
End: 2018-01-31
Payer: COMMERCIAL

## 2018-01-31 VITALS
HEART RATE: 67 BPM | DIASTOLIC BLOOD PRESSURE: 75 MMHG | OXYGEN SATURATION: 100 % | BODY MASS INDEX: 31.5 KG/M2 | HEIGHT: 70 IN | SYSTOLIC BLOOD PRESSURE: 141 MMHG | TEMPERATURE: 97.3 F | RESPIRATION RATE: 18 BRPM | WEIGHT: 220 LBS

## 2018-01-31 LAB
GLUCOSE BLD STRIP.AUTO-MCNC: 130 MG/DL (ref 70–110)
GLUCOSE BLD STRIP.AUTO-MCNC: 206 MG/DL (ref 70–110)
HCT VFR BLD AUTO: 35.2 % (ref 36–48)
HGB BLD-MCNC: 11.8 G/DL (ref 13–16)

## 2018-01-31 PROCEDURE — 82962 GLUCOSE BLOOD TEST: CPT

## 2018-01-31 PROCEDURE — 97535 SELF CARE MNGMENT TRAINING: CPT

## 2018-01-31 PROCEDURE — 85014 HEMATOCRIT: CPT | Performed by: ORTHOPAEDIC SURGERY

## 2018-01-31 PROCEDURE — 74011250637 HC RX REV CODE- 250/637: Performed by: ORTHOPAEDIC SURGERY

## 2018-01-31 PROCEDURE — 74011250637 HC RX REV CODE- 250/637: Performed by: PHYSICIAN ASSISTANT

## 2018-01-31 PROCEDURE — 36415 COLL VENOUS BLD VENIPUNCTURE: CPT | Performed by: ORTHOPAEDIC SURGERY

## 2018-01-31 PROCEDURE — 74011250636 HC RX REV CODE- 250/636: Performed by: NURSE PRACTITIONER

## 2018-01-31 PROCEDURE — 97530 THERAPEUTIC ACTIVITIES: CPT

## 2018-01-31 RX ORDER — HYDROMORPHONE HYDROCHLORIDE 2 MG/1
2-4 TABLET ORAL
Status: DISCONTINUED | OUTPATIENT
Start: 2018-01-31 | End: 2018-01-31 | Stop reason: HOSPADM

## 2018-01-31 RX ORDER — HYDROMORPHONE HYDROCHLORIDE 2 MG/1
2-4 TABLET ORAL
Qty: 60 TAB | Refills: 0 | Status: SHIPPED | OUTPATIENT
Start: 2018-01-31

## 2018-01-31 RX ADMIN — Medication 10 ML: at 07:38

## 2018-01-31 RX ADMIN — MORPHINE SULFATE 4 MG: 2 INJECTION, SOLUTION INTRAMUSCULAR; INTRAVENOUS at 07:38

## 2018-01-31 RX ADMIN — DOCUSATE SODIUM 100 MG: 100 CAPSULE, LIQUID FILLED ORAL at 09:14

## 2018-01-31 RX ADMIN — HYDROCHLOROTHIAZIDE 12.5 MG: 25 TABLET ORAL at 09:12

## 2018-01-31 RX ADMIN — ASPIRIN 81 MG: 81 TABLET, COATED ORAL at 09:12

## 2018-01-31 RX ADMIN — HYDROMORPHONE HYDROCHLORIDE 4 MG: 2 TABLET ORAL at 11:25

## 2018-01-31 RX ADMIN — MORPHINE SULFATE 4 MG: 2 INJECTION, SOLUTION INTRAMUSCULAR; INTRAVENOUS at 01:55

## 2018-01-31 RX ADMIN — LISINOPRIL 40 MG: 40 TABLET ORAL at 09:13

## 2018-01-31 RX ADMIN — ATORVASTATIN CALCIUM 20 MG: 20 TABLET, FILM COATED ORAL at 09:12

## 2018-01-31 RX ADMIN — AMLODIPINE BESYLATE 10 MG: 10 TABLET ORAL at 09:13

## 2018-01-31 RX ADMIN — OXYCODONE HYDROCHLORIDE 10 MG: 5 TABLET ORAL at 09:27

## 2018-01-31 NOTE — INTERDISCIPLINARY ROUNDS
IDR Summary      Patient: Elva Toledo MRN: [id number]    Age: 59 y.o.  : 1960     Admit Diagnosis: m17.12  Osteoarthritis of left knee      POD #: 1    DIET status: Diabetic     Lines/Tubes:   IV: YES   Needed: NO  Crocker: NO  Needed:NO  Central Line: NO Needed: NO      VTE Prophylaxis: Chemical    Mobility needs: Yes     PT ordered:  YES PT eval on chart: YES    OT ordered:  NO OT eval on chart: NO      ST ordered:  NO ST eval on chart:  NO     Disposition/Care Management:  Discharge plan: Home with [address] ordered? YES     Recommended DME from PT/OT:      DME ordered? NO- patient has all needed DME      SNF- has patient been matched? NO    Accepting bed? NO   Does patient require insurance auth?   NO      Barriers to discharge:   Financial concerns:No   PCP: Michael Keane MD    : NO  Interventions:       LOS: 2 days     Expected days until discharge:  per Ortho Team           Signed:     Nedra Alpers, FNP-BC  [address]  Hospitalist Division  Pager:  [phone]  Office:  [phone]

## 2018-01-31 NOTE — PROGRESS NOTES
Problem: Mobility Impaired (Adult and Pediatric)  Goal: *Acute Goals and Plan of Care (Insert Text)  PHYSICAL THERAPY SHORT TERM GOALS :   1.  Patient will perform/complete all bed mobility with supervision/set-up within 1 week(s). 2.  Patient will perform/complete all transfers  with modified independence within 1 week(s). 3.  Patient will ambulate 300 ft with LRAD with modified independence within 1 week(s). 4.  Patient will ascend/desend 5 stairs with S HR  with modified independence within 1 week(s). Therapist Fariba Angulo  1/29/2018   Time Calculation: 30 mins   Outcome: Progressing Towards Goal  physical Therapy TREATMENT    Patient: Julio César Freitas (62 y.o. male)  Date: 1/31/2018  Diagnosis: m17.12  Osteoarthritis of left knee Osteoarthritis of left knee  Procedure(s) (LRB):  LEFT TOTAL KNEE REPLACEMENT WITH FRANCO, intraarticular joint inject Dr. Sj Ortiz solution (Left) 2 Days Post-Op  Precautions: Fall, WBAT (LLE)   Chart, physical therapy assessment, plan of care and goals were reviewed. PLOF:Independent, ambulatory without an AD, has a RW at home, 1 flight of stairs  ASSESSMENT:  Pt sitting in recliner with (B)LE elevated. Increased time needed to lower LLE down to floor. Increased time for sit to stand. Ambulated with RW 80ft, step to gt pattern, multiple standing rest breaks due to pain, no LOB or path deviations. Pt voided standing at toilet prior to returning to bed. Hook tech used to assist LLE into bed, CGA for safety. Unable to perform stair nego at this time due to pain. Education:hook tech, HEP 3x/day  Progression toward goals:  []      Improving appropriately and progressing toward goals  [x]      Improving slowly and progressing toward goals  []      Not making progress toward goals and plan of care will be adjusted     PLAN:  Patient continues to benefit from skilled intervention to address the above impairments.   Continue treatment per established plan of care.  Discharge Recommendations:  Home Health  Further Equipment Recommendations for Discharge:  rolling walker(has oNE)     SUBJECTIVE:   Patient stated I had medicine 30 minutes ago but its not working.     OBJECTIVE DATA SUMMARY:   Critical Behavior:  Neurologic State: Alert, Appropriate for age, Eyes open spontaneously  Orientation Level: Oriented X4, Appropriate for age  Cognition: Follows commands, Appropriate safety awareness, Appropriate for age attention/concentration, Appropriate decision making  Safety/Judgement: Awareness of environment, Fall prevention  Functional Mobility Training:  Bed Mobility:  Sit to Supine: Modified independent;Contact guard assistance  Scooting: Modified independent  Transfers:  Sit to Stand: Contact guard assistance; Additional time  Stand to Sit: Contact guard assistance  Balance:  Sitting: Intact  Standing: With support  Standing - Static: Fair  Standing - Dynamic : Fair  Ambulation/Gait Training:  Distance (ft): 80 Feet (ft)  Assistive Device: Gait belt;Walker, rolling  Ambulation - Level of Assistance: Contact guard assistance;Stand-by asssistance  Gait Abnormalities: Antalgic;Decreased step clearance; Step to gait  Left Side Weight Bearing: As tolerated  Stance: Right increased  Speed/Juana: Slow  Step Length: Right shortened  Swing Pattern: Left asymmetrical  Pain:  Pre:8  Post:9  Pain Scale 1: Numeric (0 - 10)    Activity Tolerance:   Fair(-)  Please refer to the flowsheet for vital signs taken during this treatment.   After treatment:   [] Patient left in no apparent distress sitting up in chair  [x] Patient left in no apparent distress in bed  [x] Call bell left within reach  [] Nursing notified  [] Caregiver present  [] Bed alarm activated      Lowell Lock, PTA   Time Calculation: 26 mins

## 2018-01-31 NOTE — PROGRESS NOTES
Problem: Self Care Deficits Care Plan (Adult)  Goal: *Acute Goals and Plan of Care (Insert Text)  Occupational Therapy Goals  Initiated 1/30/2018 within 7 day(s). 1.  Patient will perform grooming tasks while standing with modified independence. 2.  Patient will perform lower body dressing with modified independence utilizing AE, prn.  3.  Patient will perform functional task in standing for 8 minutes with modified independence and fair+ dynamic standing balance to increase activity tolerance for ADLs. 4.  Patient will perform toilet transfers with modified independence. 5.  Patient will perform all aspects of toileting with modified independence. 6.  Patient will participate in upper extremity therapeutic exercise/activities with modified independence for 8 minutes to maintain BUE strength for ADLs. 7.  Patient will utilize energy conservation techniques during functional activities with minimal verbal cues. Outcome: Progressing Towards Goal  Occupational Therapy TREATMENT    Patient: Margaret Ching (1 y.o. male)  Date: 1/31/2018  Diagnosis: m17.12  Osteoarthritis of left knee Osteoarthritis of left knee  Procedure(s) (LRB):  LEFT TOTAL KNEE REPLACEMENT WITH FRANCO, intraarticular joint inject Dr. Liz Rojo solution (Left) 2 Days Post-Op  Precautions: Fall, WBAT (LLE)  Chart, occupational therapy assessment, plan of care, and goals were reviewed. PLOF: Independent    ASSESSMENT:  Pt seen for LB dressing ADLs w/use of adaptive equipment. Pt c/o L knee pain, 10/10, requires assist w/LLE and bed mobility. Pt tolerates sitting EOB ~ 15 minutes performing LB dressing ADLs w/increase time.  (see functional levels below) Issued reach, sock aid and long handled shoehorn  EDUCATION Pt educated on use of adaptive equipment w/LB dressing ADLs  Progression toward goals:  [x]          Improving appropriately and progressing toward goals  []          Improving slowly and progressing toward goals  []          Not making progress toward goals and plan of care will be adjusted     PLAN:  Patient continues to benefit from skilled intervention to address the above impairments. Continue treatment per established plan of care. Discharge Recommendations:  Home Health  Further Equipment Recommendations for Discharge:  N/A, pt reports he has DME     SUBJECTIVE:   Patient stated This pain is worse today than it was yesterday.     OBJECTIVE DATA SUMMARY:     G CODES:  Self Care  Current  CJ= 20-39%  [id number] Goal  CI= 1-19%. The severity rating is based on the Other functional assessment    Cognitive/Behavioral Status:  Neurologic State: Alert  Orientation Level: Oriented X4  Cognition: Follows commands  Safety/Judgement: Awareness of environment, Fall prevention  Functional Mobility and Transfers for ADLs:   Bed Mobility:  Sit to Supine: Contact guard assistance (requires assist w/LLE)  Scooting: Contact guard assistance (requires assist w/LLE)   Transfers:  Sit to Stand: Contact guard assistance (w/RW)  Balance:  Sitting: Intact  Standing: With support  Standing - Static: Fair  Standing - Dynamic : Fair  ADL Intervention:  Lower Body Dressing Assistance  Underpants: Contact guard assistance  Socks: Modified independent  Leg Crossed Method Used: No  Position Performed: Seated edge of bed  Cues: Don;Doff  Adaptive Equipment Used: Reacher;Sock aid    Pain:  Pre Treatment:10  Post Treatment:5  Pain Scale 1: Numeric (0 - 10)  Pain Intensity 1: 5  Pain Location 1: Leg  Pain Orientation 1: Left  Pain Description 1: Aching  Pain Intervention(s) 1: Medication (see MAR)    Activity Tolerance:    Fair 2/2 c/o pain    Please refer to the flowsheet for vital signs taken during this treatment.   After treatment:   []  Patient left in no apparent distress sitting up in chair  [x]  Patient left in no apparent distress in bed  [x]  Call bell left within reach  [x]  Nursing notified  [x]  Caregiver present  []  Bed alarm activated    Lenore High QUIANA Mcgee  Time Calculation: 23 mins

## 2018-01-31 NOTE — PROGRESS NOTES
Problem: Mobility Impaired (Adult and Pediatric)  Goal: *Acute Goals and Plan of Care (Insert Text)  PHYSICAL THERAPY SHORT TERM GOALS :   1.  Patient will perform/complete all bed mobility with supervision/set-up within 1 week(s). 2.  Patient will perform/complete all transfers  with modified independence within 1 week(s). 3.  Patient will ambulate 300 ft with LRAD with modified independence within 1 week(s). 4.  Patient will ascend/desend 5 stairs with S HR  with modified independence within 1 week(s). Therapist Anahi Mina  1/29/2018   Time Calculation: 30 mins   Outcome: Progressing Towards Goal  physical Therapy TREATMENT    Patient: Osmel Sahu (12 y.o. male)  Date: 1/31/2018  Diagnosis: m17.12  Osteoarthritis of left knee Osteoarthritis of left knee  Procedure(s) (LRB):  LEFT TOTAL KNEE REPLACEMENT WITH FRANCO, intraarticular joint inject Dr. Aleks jacob (Left) 2 Days Post-Op  Precautions: Fall, WBAT (LLE)   Chart, physical therapy assessment, plan of care and goals were reviewed. PLOF:independent, ambulatory without an AD, 5 step to enter home  ASSESSMENT:  Pt supine in bed upon entering room. [employer] tech used to assist LLE in/out of bed and CGA for safety. Ambulated 20ft total with RW from bed<>w/c. Negotiated 5 steps holding L hand rail with (B) hands. Returned to room and left supine in bed. Education: Stair nego technique  Progression toward goals:  []      Improving appropriately and progressing toward goals  [x]      Improving slowly and progressing toward goals  []      Not making progress toward goals and plan of care will be adjusted     PLAN:  Patient continues to benefit from skilled intervention to address the above impairments. Continue treatment per established plan of care. Discharge Recommendations:  Home Health  Further Equipment Recommendations for Discharge:  rolling walker(pt has one)     SUBJECTIVE:   Patient stated I am ok.     OBJECTIVE DATA SUMMARY: Critical Behavior:  Neurologic State: Alert  Orientation Level: Oriented X4  Cognition: Follows commands  Safety/Judgement: Awareness of environment, Fall prevention  Functional Mobility Training:  Bed Mobility:  Supine to Sit: Modified independent;Setup  Sit to Supine: Modified independent;Contact guard assistance  Scooting: Contact guard assistance (requires assist w/LLE)  Transfers:  Sit to Stand: Contact guard assistance  Stand to Sit: Stand-by asssistance  Balance:  Sitting: Intact  Standing: With support  Standing - Static: Fair  Standing - Dynamic : Fair  Ambulation/Gait Training:  Distance (ft): 20 Feet (ft)  Assistive Device: Gait belt;Walker, rolling  Ambulation - Level of Assistance: Contact guard assistance;Stand-by asssistance  Gait Abnormalities: Antalgic;Decreased step clearance; Step to gait  Left Side Weight Bearing: As tolerated  Stance: Right increased  Speed/Juana: Slow  Step Length: Right shortened  Swing Pattern: Left asymmetrical  Stairs:  Number of Stairs Trained: 5  Stairs - Level of Assistance: Stand-by asssistance;Contact guard assistance  Rail Use: Left   Pain:  Pre:5  Post:5  Pain Scale 1: Numeric (0 - 10)    Activity Tolerance:   Fair  Please refer to the flowsheet for vital signs taken during this treatment.   After treatment:   [] Patient left in no apparent distress sitting up in chair  [x] Patient left in no apparent distress in bed  [x] Call bell left within reach  [x] Nursing notified  [] Caregiver present  [] Bed alarm activated      Claire Dempsey PTA   Time Calculation: 24 mins

## 2018-01-31 NOTE — PROGRESS NOTES
Entered chart per primary nurse, Bimal Linares RN, to review dressing order change. HIPPA maintained.

## 2018-01-31 NOTE — PROGRESS NOTES
Bedside and Verbal shift change report given to Mike Sarabia (oncoming nurse) by Ban Lott RN (offgoing nurse). Report included the following information SBAR, Kardex, MAR and Recent Results.

## 2018-01-31 NOTE — PROGRESS NOTES
Problem: Falls - Risk of  Goal: *Absence of Falls  Document Margaret Fall Risk and appropriate interventions in the flowsheet.    Outcome: Progressing Towards Goal  Fall Risk Interventions:  Mobility Interventions: OT consult for ADLs         Medication Interventions: Evaluate medications/consider consulting pharmacy, Patient to call before getting OOB    Elimination Interventions: Call light in reach, Patient to call for help with toileting needs, Urinal in reach, Toileting schedule/hourly rounds

## 2018-01-31 NOTE — DISCHARGE SUMMARY
Discharge Summary    Admit Date: 2018  Discharge Date:  2018     Patient ID:   Name:  Félix Carney      Age:  59 y.o.    :  1960    Admitting Diagnosis: m17.12  Osteoarthritis of left knee     Post Operative Day: 2    Operative Procedures:  TOTAL KNEE ARTHROPLASTY [06043] (KNEE ARTHROPLASTY TOTAL)      Isolation Precautions:   Not required. Patient is not currently contagious. Physical Exam on Discharge:  Visit Vitals    /75 (BP 1 Location: Left arm, BP Patient Position: At rest)    Pulse 67    Temp 97.3 °F (36.3 °C)    Resp 18    Ht 5' 10\" (1.778 m)    Wt 99.8 kg (220 lb)    SpO2 100%    BMI 31.57 kg/m2         General: in no apparent distress   Wound: clean, dry   Extremities:  Neurovascular intact    Dressing:  Dry   DVT Exam:   No evidence of DVT seen on physical exam;  compartments soft and NT. Relevant labs within last 72 hours:    CBC w/Diff    Lab Results   Component Value Date/Time    HCT 35.2 (L) 2018 03:39 AM    No results found for: BANDS, LYMPHOCYTES, MONOS, EOS, BASOS, PROMYELOCYTE, BLAST, RDW       BMP   Lab Results   Component Value Date     2018    CO2 26 2018    BUN 17 2018          Coagulation   Lab Results   Component Value Date    INR 0.9 2018    APTT 23.5 2018              Condition at discharge: Afebrile  Ambulating  Eating, Drinking, Voiding  Stable    Current Discharge Medication List      START taking these medications    Details   enoxaparin (LOVENOX) 40 mg/0.4 mL 0.4 mL by SubCUTAneous route every twenty-four (24) hours. Qty: 14 Syringe, Refills: 0      oxyCODONE IR (ROXICODONE) 5 mg immediate release tablet Take 1-2 Tabs by mouth every four (4) hours as needed.  Max Daily Amount: 60 mg.  Qty: 60 Tab, Refills: 0    Associated Diagnoses: Primary osteoarthritis of left knee         CONTINUE these medications which have NOT CHANGED    Details   amLODIPine (NORVASC) 10 mg tablet Take 10 mg by mouth daily. atorvastatin (LIPITOR) 20 mg tablet Take 20 mg by mouth daily. hydroCHLOROthiazide (HYDRODIURIL) 25 mg tablet Take 12.5 mg by mouth daily. LANTUS SOLOSTAR 100 unit/mL (3 mL) inpn 36 Units by SubCUTAneous route nightly. lisinopril (PRINIVIL, ZESTRIL) 40 mg tablet Take 40 mg by mouth daily. JANUVIA 100 mg tablet Take 100 mg by mouth daily. metFORMIN (GLUCOPHAGE) 500 mg tablet Take 500 mg by mouth two (2) times daily (with meals). aspirin delayed-release 81 mg tablet Take 81 mg by mouth daily. PCP:  Radhika Henderson MD        Disposition:  Clear for discharge to home, if clear by medicine service. Follow-up in the office in  1 month with Dr. Eulalio Hall; call [phone] to schedule appointment. Wound Care: staples out 10-14 days    DVT prophylaxis:  Lovenox per ortho for 14 days.     Weightbearing Status: Estuardo Pagan PA-C  1/31/2018  Office [phone]  Cell [phone]

## 2018-01-31 NOTE — PROGRESS NOTES
Home care referral sent to [facility] via [address] and called to the [facility] for f/u.

## 2018-01-31 NOTE — PROGRESS NOTES
[facility]  Hospitalist Division        Inpatient Daily Progress Note    Daily progress Note    Patient: Margaret Ching MRN: [id number]  CSN: [id number]    YOB: 1960  Age: 59 y.o. Sex: male    DOA: 1/29/2018 LOS:  LOS: 2 days                    Chief Complaint:  S/p Left total knee arthroplasty     Subjective:      Continues to c/o uncontrolled pain     Objective:      Visit Vitals    /75 (BP 1 Location: Left arm, BP Patient Position: At rest)    Pulse 67    Temp 97.3 °F (36.3 °C)    Resp 18    Ht 5' 10\" (1.778 m)    Wt 99.8 kg (220 lb)    SpO2 100%    BMI 31.57 kg/m2         Physical Exam:  General appearance: alert, cooperative, no distress, appears stated age  Lungs: clear to auscultation throughout   Heart: regular rate and rhythm, S1, S2 normal, no murmur, click, rub or gallop  Abdomen: soft, non tender, non distended. Normoactive bowel sounds  Extremities: LLE ace dressing- CDI. BLE compartments soft, non tender   Skin: Skin color, texture, turgor normal. No rashes or lesions  Neurologic: Grossly normal  PSY: Mood and affect normal, appropriately behaved      Intake and Output:  Current Shift:     Last three shifts:  01/29 1901 - 01/31 0700  In: 1600 [P.O.:700;  I.V.:900]  Out: 2225 [Urine:2225]    Recent Results (from the past 24 hour(s))   GLUCOSE, POC    Collection Time: 01/30/18  4:52 PM   Result Value Ref Range    Glucose (POC) 141 (H) 70 - 110 mg/dL   GLUCOSE, POC    Collection Time: 01/30/18  9:25 PM   Result Value Ref Range    Glucose (POC) 172 (H) 70 - 110 mg/dL   HGB & HCT    Collection Time: 01/31/18  3:39 AM   Result Value Ref Range    HGB 11.8 (L) 13.0 - 16.0 g/dL    HCT 35.2 (L) 36.0 - 48.0 %   GLUCOSE, POC    Collection Time: 01/31/18  6:06 AM   Result Value Ref Range    Glucose (POC) 130 (H) 70 - 110 mg/dL   GLUCOSE, POC    Collection Time: 01/31/18 11:31 AM   Result Value Ref Range    Glucose (POC) 206 (H) 70 - 110 mg/dL Lab Results   Component Value Date/Time    Glucose 185 01/29/2018 10:40 AM        Assessment/Plan:     Patient Active Problem List   Diagnosis Code    Osteoarthritis of left knee M17.12    HTN (hypertension) I10    DM (diabetes mellitus) ([facility] [address] E11.9    HARJIT (obstructive sleep apnea) G47.33    HLD (hyperlipidemia) E78.5       A/P:  - S/p Left total knee arthroplasty: pain medications adjusted per Ortho   - HTN: stable.  Continue amlodipine, HCTZ, Lisinopril   - DM: well controlled with SSI and Levemir; diabetic diet   - Hypercholesterolemia: atorvastatin   - HARJIT: Cpap   - DVT prophylaxis: Lovenox   - Medically cleared for discharge home with [facility] services          DEBRA [address]  Pager:  [phone]  Office:  [phone]

## 2018-01-31 NOTE — PROGRESS NOTES
Bedside shift report received from [address]: [address] [facility], on room air, resting in bed, left leg ace wrap intact; shift assessment done; family at bedside; assisted with pt's needs; urinal within reach    2138: Morphine 2 mg IV given as requested for 9/10 left leg pain; ice pack given          : ; due Levemir 30 units sc given; refused Humalog coverage    2232: still c/o 9/10 leg pain; additional 2 mg Morphine IV given; will do pain  Reassessment    2330: quietly sleeping with cpap on    0155: Morphine IV given as requested for pain- see flowsheet    0400: sleeping with cpap on    0700: sleeping with Cpap on; needs within reach; wife at bedside    Bedside and Verbal shift change report given to Bessie Love RN (oncoming nurse) by Kathy Vazquez RN (offgoing nurse). Report included the following information SBAR, Kardex, Intake/Output and Recent Results.

## 2018-01-31 NOTE — DISCHARGE INSTRUCTIONS
STAPLES OUT IN 14 DAYS      DISCHARGE SUMMARY from Nurse    PATIENT INSTRUCTIONS:    After general anesthesia or intravenous sedation, for 24 hours or while taking prescription Narcotics:  · Limit your activities  · Do not drive and operate hazardous machinery  · Do not make important personal or business decisions  · Do  not drink alcoholic beverages  · If you have not urinated within 8 hours after discharge, please contact your surgeon on call. Report the following to your surgeon:  · Excessive pain, swelling, redness or odor of or around the surgical area  · Temperature over 100.5  · Nausea and vomiting lasting longer than 4 hours or if unable to take medications  · Any signs of decreased circulation or nerve impairment to extremity: change in color, persistent  numbness, tingling, coldness or increase pain  · Any questions    What to do at Home:  Recommended activity: Activity as tolerated,     *  Please give a list of your current medications to your Primary Care Provider. *  Please update this list whenever your medications are discontinued, doses are      changed, or new medications (including over-the-counter products) are added. *  Please carry medication information at all times in case of emergency situations. These are general instructions for a healthy lifestyle:    No smoking/ No tobacco products/ Avoid exposure to second hand smoke  Surgeon General's Warning:  Quitting smoking now greatly reduces serious risk to your health.     Obesity, smoking, and sedentary lifestyle greatly increases your risk for illness    A healthy diet, regular physical exercise & weight monitoring are important for maintaining a healthy lifestyle    You may be retaining fluid if you have a history of heart failure or if you experience any of the following symptoms:  Weight gain of 3 pounds or more overnight or 5 pounds in a week, increased swelling in our hands or feet or shortness of breath while lying flat in bed.  Please call your doctor as soon as you notice any of these symptoms; do not wait until your next office visit. Recognize signs and symptoms of STROKE:    F-face looks uneven    A-arms unable to move or move unevenly    S-speech slurred or non-existent    T-time-call 911 as soon as signs and symptoms begin-DO NOT go       Back to bed or wait to see if you get better-TIME IS BRAIN. Warning Signs of HEART ATTACK     Call 911 if you have these symptoms:   Chest discomfort. Most heart attacks involve discomfort in the center of the chest that lasts more than a few minutes, or that goes away and comes back. It can feel like uncomfortable pressure, squeezing, fullness, or pain.  Discomfort in other areas of the upper body. Symptoms can include pain or discomfort in one or both arms, the back, neck, jaw, or stomach.  Shortness of breath with or without chest discomfort.  Other signs may include breaking out in a cold sweat, nausea, or lightheadedness. Don't wait more than five minutes to call 911 - MINUTES MATTER! Fast action can save your life. Calling 911 is almost always the fastest way to get lifesaving treatment. Emergency Medical Services staff can begin treatment when they arrive -- up to an hour sooner than if someone gets to the hospital by car. The discharge information has been reviewed with the patient. The patient verbalized understanding. Discharge medications reviewed with the patient and appropriate educational materials and side effects teaching were provided.   ___________________________________________________________________________________________________________________________________

## 2018-02-01 ENCOUNTER — HOME CARE VISIT (OUTPATIENT)
Dept: SCHEDULING | Facility: HOME HEALTH | Age: 58
End: 2018-02-01
Payer: COMMERCIAL

## 2018-02-01 VITALS
SYSTOLIC BLOOD PRESSURE: 120 MMHG | RESPIRATION RATE: 17 BRPM | DIASTOLIC BLOOD PRESSURE: 80 MMHG | HEART RATE: 90 BPM | TEMPERATURE: 98.7 F

## 2018-02-01 PROCEDURE — G0299 HHS/HOSPICE OF RN EA 15 MIN: HCPCS

## 2018-02-01 PROCEDURE — 400013 HH SOC

## 2018-02-01 PROCEDURE — G0151 HHCP-SERV OF PT,EA 15 MIN: HCPCS

## 2018-02-02 ENCOUNTER — HOME CARE VISIT (OUTPATIENT)
Dept: SCHEDULING | Facility: HOME HEALTH | Age: 58
End: 2018-02-02
Payer: COMMERCIAL

## 2018-02-02 VITALS
DIASTOLIC BLOOD PRESSURE: 64 MMHG | OXYGEN SATURATION: 98 % | TEMPERATURE: 99.4 F | RESPIRATION RATE: 16 BRPM | HEART RATE: 89 BPM | SYSTOLIC BLOOD PRESSURE: 130 MMHG

## 2018-02-02 PROCEDURE — A6253 ABSORPT DRG > 48 SQ IN W/O B: HCPCS

## 2018-02-02 PROCEDURE — A6530 COMPRESSION STOCKING BK18-30: HCPCS

## 2018-02-02 PROCEDURE — G0299 HHS/HOSPICE OF RN EA 15 MIN: HCPCS

## 2018-02-02 PROCEDURE — G0157 HHC PT ASSISTANT EA 15: HCPCS

## 2018-02-03 ENCOUNTER — HOME CARE VISIT (OUTPATIENT)
Dept: SCHEDULING | Facility: HOME HEALTH | Age: 58
End: 2018-02-03
Payer: COMMERCIAL

## 2018-02-03 VITALS
OXYGEN SATURATION: 98 % | HEART RATE: 90 BPM | DIASTOLIC BLOOD PRESSURE: 70 MMHG | SYSTOLIC BLOOD PRESSURE: 152 MMHG | RESPIRATION RATE: 20 BRPM | TEMPERATURE: 100 F

## 2018-02-03 VITALS
OXYGEN SATURATION: 99 % | RESPIRATION RATE: 16 BRPM | DIASTOLIC BLOOD PRESSURE: 70 MMHG | SYSTOLIC BLOOD PRESSURE: 136 MMHG | HEART RATE: 96 BPM | TEMPERATURE: 100 F

## 2018-02-03 VITALS
DIASTOLIC BLOOD PRESSURE: 60 MMHG | TEMPERATURE: 100.3 F | SYSTOLIC BLOOD PRESSURE: 120 MMHG | HEART RATE: 95 BPM | OXYGEN SATURATION: 96 %

## 2018-02-03 PROCEDURE — G0299 HHS/HOSPICE OF RN EA 15 MIN: HCPCS

## 2018-02-05 ENCOUNTER — HOME CARE VISIT (OUTPATIENT)
Dept: SCHEDULING | Facility: HOME HEALTH | Age: 58
End: 2018-02-05
Payer: COMMERCIAL

## 2018-02-05 ENCOUNTER — HOME CARE VISIT (OUTPATIENT)
Dept: HOME HEALTH SERVICES | Facility: HOME HEALTH | Age: 58
End: 2018-02-05
Payer: COMMERCIAL

## 2018-02-05 VITALS
RESPIRATION RATE: 16 BRPM | SYSTOLIC BLOOD PRESSURE: 132 MMHG | DIASTOLIC BLOOD PRESSURE: 70 MMHG | HEART RATE: 97 BPM | OXYGEN SATURATION: 98 % | TEMPERATURE: 98.6 F

## 2018-02-05 VITALS
TEMPERATURE: 99.7 F | HEART RATE: 88 BPM | DIASTOLIC BLOOD PRESSURE: 74 MMHG | SYSTOLIC BLOOD PRESSURE: 128 MMHG | OXYGEN SATURATION: 98 %

## 2018-02-05 PROCEDURE — G0299 HHS/HOSPICE OF RN EA 15 MIN: HCPCS

## 2018-02-05 PROCEDURE — G0157 HHC PT ASSISTANT EA 15: HCPCS

## 2018-02-06 ENCOUNTER — HOME CARE VISIT (OUTPATIENT)
Dept: SCHEDULING | Facility: HOME HEALTH | Age: 58
End: 2018-02-06
Payer: COMMERCIAL

## 2018-02-06 VITALS
HEART RATE: 89 BPM | SYSTOLIC BLOOD PRESSURE: 130 MMHG | OXYGEN SATURATION: 98 % | TEMPERATURE: 98.3 F | DIASTOLIC BLOOD PRESSURE: 85 MMHG

## 2018-02-06 PROCEDURE — G0157 HHC PT ASSISTANT EA 15: HCPCS

## 2018-02-07 ENCOUNTER — HOME CARE VISIT (OUTPATIENT)
Dept: SCHEDULING | Facility: HOME HEALTH | Age: 58
End: 2018-02-07
Payer: COMMERCIAL

## 2018-02-07 PROCEDURE — G0157 HHC PT ASSISTANT EA 15: HCPCS

## 2018-02-08 ENCOUNTER — HOME CARE VISIT (OUTPATIENT)
Dept: SCHEDULING | Facility: HOME HEALTH | Age: 58
End: 2018-02-08
Payer: COMMERCIAL

## 2018-02-08 VITALS
HEART RATE: 85 BPM | SYSTOLIC BLOOD PRESSURE: 130 MMHG | OXYGEN SATURATION: 98 % | TEMPERATURE: 98.7 F | DIASTOLIC BLOOD PRESSURE: 70 MMHG

## 2018-02-08 VITALS
SYSTOLIC BLOOD PRESSURE: 132 MMHG | DIASTOLIC BLOOD PRESSURE: 70 MMHG | HEART RATE: 84 BPM | OXYGEN SATURATION: 99 % | RESPIRATION RATE: 20 BRPM | TEMPERATURE: 97.6 F

## 2018-02-08 VITALS
OXYGEN SATURATION: 97 % | SYSTOLIC BLOOD PRESSURE: 128 MMHG | DIASTOLIC BLOOD PRESSURE: 62 MMHG | TEMPERATURE: 98 F | HEART RATE: 92 BPM

## 2018-02-08 PROCEDURE — G0157 HHC PT ASSISTANT EA 15: HCPCS

## 2018-02-08 PROCEDURE — G0300 HHS/HOSPICE OF LPN EA 15 MIN: HCPCS

## 2018-02-09 ENCOUNTER — HOME CARE VISIT (OUTPATIENT)
Dept: SCHEDULING | Facility: HOME HEALTH | Age: 58
End: 2018-02-09
Payer: COMMERCIAL

## 2018-02-09 PROCEDURE — G0151 HHCP-SERV OF PT,EA 15 MIN: HCPCS

## 2018-02-12 ENCOUNTER — HOME CARE VISIT (OUTPATIENT)
Dept: SCHEDULING | Facility: HOME HEALTH | Age: 58
End: 2018-02-12
Payer: COMMERCIAL

## 2018-02-12 VITALS
DIASTOLIC BLOOD PRESSURE: 90 MMHG | OXYGEN SATURATION: 99 % | SYSTOLIC BLOOD PRESSURE: 109 MMHG | TEMPERATURE: 97.7 F | HEART RATE: 94 BPM | RESPIRATION RATE: 20 BRPM

## 2018-02-12 VITALS
OXYGEN SATURATION: 98 % | SYSTOLIC BLOOD PRESSURE: 122 MMHG | HEART RATE: 92 BPM | TEMPERATURE: 99.3 F | DIASTOLIC BLOOD PRESSURE: 70 MMHG

## 2018-02-12 PROCEDURE — G0157 HHC PT ASSISTANT EA 15: HCPCS

## 2018-02-12 PROCEDURE — G0299 HHS/HOSPICE OF RN EA 15 MIN: HCPCS

## 2018-02-13 ENCOUNTER — HOME CARE VISIT (OUTPATIENT)
Dept: SCHEDULING | Facility: HOME HEALTH | Age: 58
End: 2018-02-13
Payer: COMMERCIAL

## 2018-02-13 VITALS
TEMPERATURE: 99.5 F | HEART RATE: 90 BPM | OXYGEN SATURATION: 98 % | SYSTOLIC BLOOD PRESSURE: 124 MMHG | DIASTOLIC BLOOD PRESSURE: 75 MMHG

## 2018-02-13 PROCEDURE — G0157 HHC PT ASSISTANT EA 15: HCPCS

## 2018-02-14 ENCOUNTER — HOME CARE VISIT (OUTPATIENT)
Dept: SCHEDULING | Facility: HOME HEALTH | Age: 58
End: 2018-02-14
Payer: COMMERCIAL

## 2018-02-14 VITALS
DIASTOLIC BLOOD PRESSURE: 74 MMHG | OXYGEN SATURATION: 97 % | HEART RATE: 88 BPM | SYSTOLIC BLOOD PRESSURE: 128 MMHG | TEMPERATURE: 98.7 F

## 2018-02-14 PROCEDURE — G0157 HHC PT ASSISTANT EA 15: HCPCS

## 2018-02-15 ENCOUNTER — HOME CARE VISIT (OUTPATIENT)
Dept: SCHEDULING | Facility: HOME HEALTH | Age: 58
End: 2018-02-15
Payer: COMMERCIAL

## 2018-02-15 VITALS
TEMPERATURE: 98.3 F | HEART RATE: 83 BPM | DIASTOLIC BLOOD PRESSURE: 75 MMHG | OXYGEN SATURATION: 98 % | SYSTOLIC BLOOD PRESSURE: 128 MMHG

## 2018-02-15 PROCEDURE — G0157 HHC PT ASSISTANT EA 15: HCPCS

## 2018-02-15 PROCEDURE — G0299 HHS/HOSPICE OF RN EA 15 MIN: HCPCS

## 2018-02-16 ENCOUNTER — HOME CARE VISIT (OUTPATIENT)
Dept: SCHEDULING | Facility: HOME HEALTH | Age: 58
End: 2018-02-16
Payer: COMMERCIAL

## 2018-02-16 ENCOUNTER — HOME CARE VISIT (OUTPATIENT)
Dept: HOME HEALTH SERVICES | Facility: HOME HEALTH | Age: 58
End: 2018-02-16
Payer: COMMERCIAL

## 2018-02-16 VITALS
RESPIRATION RATE: 20 BRPM | TEMPERATURE: 97 F | HEART RATE: 90 BPM | OXYGEN SATURATION: 98 % | DIASTOLIC BLOOD PRESSURE: 75 MMHG | SYSTOLIC BLOOD PRESSURE: 124 MMHG

## 2018-02-16 VITALS
TEMPERATURE: 98 F | OXYGEN SATURATION: 98 % | HEART RATE: 94 BPM | DIASTOLIC BLOOD PRESSURE: 65 MMHG | SYSTOLIC BLOOD PRESSURE: 115 MMHG

## 2018-02-16 PROCEDURE — G0157 HHC PT ASSISTANT EA 15: HCPCS

## 2018-02-19 ENCOUNTER — HOSPITAL ENCOUNTER (OUTPATIENT)
Dept: PHYSICAL THERAPY | Age: 58
Discharge: HOME | End: 2018-02-19
Payer: COMMERCIAL

## 2018-02-19 PROCEDURE — 97161 PT EVAL LOW COMPLEX 20 MIN: CPT

## 2018-02-19 PROCEDURE — 97140 MANUAL THERAPY 1/> REGIONS: CPT

## 2018-02-19 PROCEDURE — 97110 THERAPEUTIC EXERCISES: CPT

## 2018-02-19 PROCEDURE — 97162 PT EVAL MOD COMPLEX 30 MIN: CPT

## 2018-02-19 NOTE — PROGRESS NOTES
[address]  [facility] PHYSICAL THERAPY  [address] [facility] [address], [address] - Phone: [phone]  Fax: [phone] / [address]  Patient Name: Clark Gleason : 1960   Medical   Diagnosis: Left knee pain [M25.562] Treatment Diagnosis: Left knee pain [M25.562]   Onset Date: Chronic; DOS 2018     Referral Source: Misti Elise MD [facility]): 2018   Prior Hospitalization: See medical history Provider #: [id number]   Prior Level of Function: Independent ambulation with occasional use of SPC (previous R TKA). Works full time as caterer   Comorbidities: DM, HTN, R TKA    Medications: Verified on Patient Summary List   The Plan of Care and following information is based on the information from the initial evaluation.   ===========================================================================================  Assessment / key information:  Pt is a 59y.o. year old male with subjective complaints of left knee pain s/p TKA. Pt states he has had uncomplicated post-op course and recent d/c from PT. Current pain is rated as 2 to 10/10. Current functional limitations: walking, stairs, standing, bending. FOTO score= 37/100 indicating significant impairment to functional activities. Today's evaulation is significant for   1) gait impairment: antalgic LLE; step to gait pattern with ww. Transfers: sit to stand with BUE's. Sit to/from supine with increased time, use of RLE to assist LLE in/out of bed. 2) Impaired AROM/PROM L knee (-10/(-9) to 62/65 degrees  3) Impaired strength:  Hip flex L 2+/5; Knee Ext L 2+/5; flex  L 3-/5.       Pt will be a good candidate for skilled PT to address these impairments and promote return to normal ADLs and functional mobility for improved quality of life.    ===========================================================================================  Eval Complexity: History MEDIUM  Complexity : 1-2 comorbidities / personal factors will impact the outcome/ POC ;  Examination  MEDIUM Complexity : 3 Standardized tests and measures addressing body structure, function, activity limitation and / or participation in recreation ; Presentation MEDIUM Complexity : Evolving with changing characteristics ; Decision Making MEDIUM Complexity : FOTO score of 26-74; Overall Complexity MEDIUM    Problem List: pain affecting function, decrease ROM, decrease strength, edema affecting function, impaired gait/ balance, decrease ADL/ functional abilitiies, decrease activity tolerance, decrease flexibility/ joint mobility and decrease transfer abilities   Treatment Plan may include any combination of the following: Therapeutic exercise, Therapeutic activities, Neuromuscular re-education, Physical agent/modality, Gait/balance training, Manual therapy, Patient education and Functional mobility training  Patient / Family readiness to learn indicated by: asking questions, trying to perform skills and interest  Persons(s) to be included in education: patient (P)  Barriers to Learning/Limitations: None  Measures taken:    Patient Goal (s): \"end pain, full use of knee\"   Patient self reported health status: good  Rehabilitation Potential: good   Short Term Goals: To be accomplished in  2  weeks:  1. Pt will be educated in appropriate HEP to decrease pain, increase ROM/strength and return pt to PLOF. 2. Pt will increase left knee AROM to (-2) to 75 in order to promote normalized gait pattern  3. Pt will improve FOTO score to >/= 47 to demonstrate a significant improvement in functional activity tolerance. 4. Pt will progress to ambulation in home with use of SPC to promote increased independence with ambulation.  Long Term Goals: To be accomplished in  6-8  weeks:  1. Pt will improve FOTO score to >/= 55 to demo a significant improvement in functional activity tolerance.   2. Patient will increase R knee strength in flexion and extension to 4+/5 so patient has improved ability to perform work duties. 3. Patient will demonstrate good eccentric quad control in lateral step down off 6 inch step with no compensation to facilitate normalized gait pattern for stair negotiation. 4. Pt will demonstrate L knee AROM 0 to 120 degrees to promote reciprocal stair negotiation. Frequency / Duration:   Patient to be seen  2-3  times per week for 8  weeks:  Patient / Caregiver education and instruction: activity modification and exercises  G-Codes (GP): n/a  Therapist Signature: Carlos Bass, PT Date: 6/67/6692   Certification Period: n/a Time: 8:47 AM   ===========================================================================================  I certify that the above Physical Therapy Services are being furnished while the patient is under my care. I agree with the treatment plan and certify that this therapy is necessary. Physician Signature:        Date:       Time:     Please sign and return to [employer] or you may fax the signed copy to [phone]. Thank you.

## 2018-02-19 NOTE — PROGRESS NOTES
PHYSICAL THERAPY - DAILY TREATMENT NOTE    Patient Name: Yumiko Guevara        Date: 2018  : 1960   yes Patient  Verified  Visit #:     Insurance: Payor: Tiffany Tello / Plan: [facility] PPO / Product Type: PPO /      In time: 900 Out time: 955   Total Treatment Time: 55     Medicare Time Tracking (below)   Total Timed Codes (min):  45 1:1 Treatment Time:  45     TREATMENT AREA =  Left knee pain [M25.562]    SUBJECTIVE  Pain Level (on 0 to 10 scale):  4  / 10   Medication Changes/New allergies or changes in medical history, any new surgeries or procedures?    no  If yes, update Summary List   Subjective Functional Status/Changes:  []  No changes reported     See POC          OBJECTIVE      Modalities Rationale:     decrease edema, decrease inflammation and decrease pain to improve patient's ability to perform functional mobility/ADLs and attain goals. min [] Estim, type/location:                                      []  att     []  unatt     []  w/US     []  w/ice    []  w/heat    min []  Mechanical Traction: type/lbs                   []  pro   []  sup   []  int   []  cont    []  before manual    []  after manual    min []  Ultrasound, settings/location:      min []  Iontophoresis w/ dexamethasone, location:                                               []  take home patch       []  in clinic   10 min [x]  Ice     []  Heat    location/position: L knee, supine with LE wedge    min []  Vasopneumatic Device, press/temp:     min []  Other:    [] Skin assessment post-treatment (if applicable):    []  intact    []  redness- no adverse reaction     []redness  adverse reaction:        8 min Therapeutic Exercise:  [x]  See flow sheet and pt ed below   Rationale:      increase ROM and increase strength to improve the patients ability to perform functional mobility/ADLs and attain goals. 10 min Manual Therapy: STM b/l hams, gastrocs, quad.   PROM knee flex/ext   Rationale: decrease pain, increase ROM and increase tissue extensibility to improve patient's ability to perform functional mobility/ADLs and attain goals. 5 min Gait Training: Amb. 50' with ww, vc's for step through gait pattern and increased left knee flexion in gait   Rationale: To increase safety and independence with ambulation with least restrictive AD and decreased fall risk. min Patient Education:  yes  Reviewed HEP   []  Progressed/Changed HEP based on: Other Objective/Functional Measures:    See POC     Post Treatment Pain Level (on 0 to 10) scale:   3  / 10     ASSESSMENT  Assessment/Changes in Function:     See POC     []  See Progress Note/Recertification   Patient will continue to benefit from skilled PT services to modify and progress therapeutic interventions, address functional mobility deficits, address ROM deficits and address strength deficits to attain remaining goals. Progress toward goals / Updated goals:    See POC     PLAN  []  Upgrade activities as tolerated yes Continue plan of care   []  Discharge due to :    []  Other:      Therapist: Kinjal Okeefe.  Shivam Jim, PT    Date: 2/19/2018 Time: 8:46 AM     Future Appointments  Date Time Provider Elayne Johnston   2/19/2018 9:00 AM [address]

## 2018-02-21 ENCOUNTER — HOSPITAL ENCOUNTER (OUTPATIENT)
Dept: PHYSICAL THERAPY | Age: 58
Discharge: HOME | End: 2018-02-21
Payer: COMMERCIAL

## 2018-02-21 PROCEDURE — 97140 MANUAL THERAPY 1/> REGIONS: CPT

## 2018-02-21 PROCEDURE — 97110 THERAPEUTIC EXERCISES: CPT

## 2018-02-21 PROCEDURE — 97014 ELECTRIC STIMULATION THERAPY: CPT

## 2018-02-21 NOTE — PROGRESS NOTES
PHYSICAL THERAPY - DAILY TREATMENT NOTE      Patient Name: Severa Curet        Date: 2018  : 1960   YES Patient  Verified  Visit #:   2     Insurance: Payor: Demetrius Kramer / Plan: [facility] PPO / Product Type: PPO /      In time: 8:39 (late) Out time: 9:15   Total Treatment Time: 36 min     Medicare Time Tracking (below)   Total Timed Codes (min):  n/a 1:1 Treatment Time:  n/a     TREATMENT AREA =  Left knee pain [M25.562]    SUBJECTIVE    Pain Level (on 0 to 10 scale):  3  / 10   Medication Changes/New allergies or changes in medical history, any new surgeries or procedures? NO    If yes, update Summary List   Subjective Functional Status/Changes:  []  No changes reported     \" I didn't realize I was this late. \"       OBJECTIVE    Modalities Rationale:   decrease edema, decrease inflammation and decrease pain to improve patient's ability to increase ease with ADLs and self care duties  10 min [x] Estim, type/location:  IFC to left knee, small towel roll under knee, L foot blocked from ER.                                    []  att     [x]  unatt     []  w/US     [x]  w/ice    []  w/heat    min []  Mechanical Traction: type/lbs                   []  pro   []  sup   []  int   []  cont    []  before manual    []  after manual    min []  Ultrasound, settings/location:      min []  Iontophoresis w/ dexamethasone, location:                                               []  take home patch       []  in clinic    min []  Ice     []  Heat    location/position:     min []  Vasopneumatic Device, press/temp:     min []  Other:    [x] Skin assessment post-treatment (if applicable):    [x]  intact    []  redness- no adverse reaction     []redness  adverse reaction:      16 min Therapeutic Exercise:  [x]  See flow sheet   Rationale:      increase ROM, increase strength, improve coordination, improve balance and increase proprioception to improve the patients ability to perform ADL's and functional mobility with increased ease and efficiency of movement      10 min Manual Therapy: Seated knee flexion PROM/posterior tibial glides, posterior fibular head mobs, Grade 2-3 knee extension mobs in supine,    Rationale:      decrease pain, increase ROM, increase tissue extensibility and increase postural awareness to improve patient's ability to ambulate with more normalized gait pattern. 1 min Patient Education:  YES  Reviewed HEP   []  Progressed/Changed HEP based on: Other Objective/Functional Measures:    Knee flexion AAROM w/ heel slide 80 degrees 10 x 10\"  Added seated piggy backs (knee flexion with opposite LE)  Added patient applied overpressure to 1/2 moon stretch      Post Treatment Pain Level (on 0 to 10) scale:   2-3  / 10     ASSESSMENT    Assessment/Changes in Function:     Patient with fair tolerance to manual interventions. Improved heel slide today. Active participant in session today. []  See Progress Note/Recertification   Patient will continue to benefit from skilled PT services to modify and progress therapeutic interventions, address functional mobility deficits, address ROM deficits, address strength deficits, analyze and address soft tissue restrictions, analyze and cue movement patterns, analyze and modify body mechanics/ergonomics, assess and modify postural abnormalities, address imbalance/dizziness and instruct in home and community integration to attain remaining goals. Progress toward goals / Updated goals:    First visit since IE; no significant progress noted     PLAN    [x]  Upgrade activities as tolerated YES Continue plan of care   []  Discharge due to :    []  Other:      Therapist: Lee Runner, DPT, Cert.  DN    Date: 2/21/2018 Time: 8:19 AM     Future Appointments  Date Time Provider Elayne Johnston   2/21/2018 8:30 AM Mayra Mccracken [facility]   2/23/2018 10:30 AM Claudia Greene PT [facility]   2/26/2018 9:00 AM Claudia Greene PT [facility] 3/1/2018 11:30 AM Cleophas Buggy, PT [facility]   3/2/2018 8:00 AM Fidencio Mccracken [facility]   3/5/2018 8:30 AM Fidencio LEROY Nawaf [facility]   3/7/2018 8:30 AM Fidencio LEROY Nawaf [facility]   3/9/2018 8:30 AM Cleophas Buggy, PT [facility]   3/12/2018 8:30 AM Cleophas Buggy, PT [facility]   3/14/2018 8:30 AM Fidencio LEROY Nawaf [facility]   3/16/2018 8:30 AM Cleophas Buggy, PT [facility]   3/19/2018 8:30 AM Cleophas Buggy, PT [facility]   3/21/2018 8:30 AM Fidencio Mccracken [facility]   3/23/2018 8:30 AM Fidencio LERYO Nawaf [facility]   3/26/2018 8:30 AM Fidencio LEROY Nawaf [facility]   3/28/2018 8:30 AM Cleophas Buggy, PT [facility]   3/30/2018 8:30 AM Cleophas Buggy, PT [facility]

## 2018-02-23 ENCOUNTER — HOSPITAL ENCOUNTER (OUTPATIENT)
Dept: PHYSICAL THERAPY | Age: 58
Discharge: HOME | End: 2018-02-23
Payer: COMMERCIAL

## 2018-02-23 PROCEDURE — 97032 APPL MODALITY 1+ESTIM EA 15: CPT

## 2018-02-23 PROCEDURE — 97110 THERAPEUTIC EXERCISES: CPT

## 2018-02-23 NOTE — PROGRESS NOTES
PHYSICAL THERAPY - DAILY TREATMENT NOTE      Patient Name: Danie Quintanilla        Date: 2018  : 1960   YES Patient  Verified  Visit #:   3     Insurance: Payor: Karin List / Plan: [facility] PPO / Product Type: PPO /      In time: 10:35 Out time: 11:05   Total Treatment Time: 30 min       TREATMENT AREA =  Left knee pain [M25.562]    SUBJECTIVE    Pain Level (on 0 to 10 scale):  3  / 10   Medication Changes/New allergies or changes in medical history, any new surgeries or procedures? NO    If yes, update Summary List   Subjective Functional Status/Changes:  []  No changes reported     \"I saw the surgeon (Dr. Eulalio Hall) and he said I didn't have to wear the sock anymore. \"        OBJECTIVE    Modalities Rationale:   decrease inflammation, decrease pain and increase muscle contraction/control to improve patient's ability to perform ADLs and self care duties with increased ease  10 min [x] Estim, type/location: Russian stim to Rectus/VM supine with heel prop.  5:5 on/off                                    [x]  att     []  unatt     []  w/US     [x]  w/ice    []  w/heat    min []  Mechanical Traction: type/lbs                   []  pro   []  sup   []  int   []  cont    []  before manual    []  after manual    min []  Ultrasound, settings/location:      min []  Iontophoresis w/ dexamethasone, location:                                               []  take home patch       []  in clinic    min []  Ice     []  Heat    location/position:     min []  Vasopneumatic Device, press/temp:     min []  Other:    [] Skin assessment post-treatment (if applicable):    [x]  intact    []  redness- no adverse reaction     []redness  adverse reaction:      20 min Therapeutic Exercise:  [x]  See flow sheet   Rationale:      increase ROM, increase strength, improve coordination, improve balance and increase proprioception to improve the patients ability to increase pt's stability/mobility and improve functional activity and ability to perform ADL's     min Patient Education:  YES  Reviewed HEP   []  Progressed/Changed HEP based on: Other Objective/Functional Measures: Added SAQ. Limited ROM secondary to pain and dec quad strength   Added russian stim to quad (see objective above)      Post Treatment Pain Level (on 0 to 10) scale:   2  / 10     ASSESSMENT    Assessment/Changes in Function:      Fair tolerance for therapeutic interventions today. Improved knee flexion AAROM heel slide noted. []  See Progress Note/Recertification   Patient will continue to benefit from skilled PT services to modify and progress therapeutic interventions, address functional mobility deficits, address ROM deficits, address strength deficits, analyze and address soft tissue restrictions, analyze and cue movement patterns, analyze and modify body mechanics/ergonomics, assess and modify postural abnormalities, address imbalance/dizziness and instruct in home and community integration to attain remaining goals. Progress toward goals / Updated goals:    Progressing towards goals. PLAN    [x]  Upgrade activities as tolerated YES Continue plan of care   []  Discharge due to :    []  Other:      Therapist: Cindy Seay DPT, Cert.  DN    Date: 2/23/2018 Time: 10:56 AM     Future Appointments  Date Time Provider Elayne Johnston   2/26/2018 9:00 AM Christine Guillen PT [facility]   3/1/2018 11:30 AM Christine Guillen PT [facility]   3/2/2018 8:00 AM Flor Alcocer [facility]   [facility]   3/7/2018 8:30 AM Darell HARPER [facility]   3/9/2018 8:30 AM Christine Guillen PT [facility]   3/12/2018 8:30 AM Christine Guillen PT [facility]   3/14/2018 8:30 AM Flor Alcocer [facility]   3/16/2018 8:30 AM Christine Guillen PT [facility]   3/19/2018 8:30 AM Christine Guillen PT [facility]   [facility]2018 [facility]   3/23/2018 8:30 AM Flor Mccracken [facility]   3/26/2018 8:30 AM Flor Alcocer [facility]   3/28/2018 8:30 AM Loreto Jones, PT [facility]   3/30/2018 8:30 AM Loreto Jones, PT [facility]

## 2018-02-26 ENCOUNTER — HOSPITAL ENCOUNTER (OUTPATIENT)
Dept: PHYSICAL THERAPY | Age: 58
Discharge: HOME | End: 2018-02-26
Payer: COMMERCIAL

## 2018-02-26 PROCEDURE — 97014 ELECTRIC STIMULATION THERAPY: CPT

## 2018-02-26 PROCEDURE — 97140 MANUAL THERAPY 1/> REGIONS: CPT

## 2018-02-26 PROCEDURE — 97116 GAIT TRAINING THERAPY: CPT

## 2018-02-26 PROCEDURE — 97110 THERAPEUTIC EXERCISES: CPT

## 2018-02-26 NOTE — PROGRESS NOTES
PHYSICAL THERAPY - DAILY TREATMENT NOTE      Patient Name: Sheyla Magdaleno        Date: 2018  : 1960   YES Patient  Verified  Visit #:   4     Insurance: Payor: Jyoti Short / Plan: [facility] PPO / Product Type: PPO /      In time: 8:50 Out time: 9:52   Total Treatment Time: 62 min       TREATMENT AREA =  Left knee pain [M25.562]    SUBJECTIVE    Pain Level (on 0 to 10 scale):  2-3  / 10   Medication Changes/New allergies or changes in medical history, any new surgeries or procedures? NO    If yes, update Summary List   Subjective Functional Status/Changes:  []  No changes reported     \"I'm really having a hard time sleeping at night. \"        OBJECTIVE    Modalities Rationale:   increase muscle contraction/control to improve patient's ability to perform ADLs and self care duties with increased ease  10 min [x] Estim, type/location: Russian stim to Rectus/VM supine with heel prop. 5:5 on/off.  BLE on red wedge on incline                                     []  att     [x]  unatt     []  w/US     [x]  w/ice    []  w/heat    min []  Mechanical Traction: type/lbs                   []  pro   []  sup   []  int   []  cont    []  before manual    []  after manual    min []  Ultrasound, settings/location:      min []  Iontophoresis w/ dexamethasone, location:                                               []  take home patch       []  in clinic    min []  Ice     []  Heat    location/position:     min []  Vasopneumatic Device, press/temp:     min []  Other:    [] Skin assessment post-treatment (if applicable):    [x]  intact    []  redness- no adverse reaction     []redness  adverse reaction:      34 min Therapeutic Exercise:  [x]  See flow sheet   Rationale:      increase ROM, increase strength, improve coordination, improve balance and increase proprioception to improve the patients ability to increase pt's stability/mobility and improve functional activity and ability to perform ADL's    10 min Manual Therapy: SLR, Grade 4 knee extension mobs, IASTM with medium cups to distal HS bilateral joint line. Rationale:      decrease pain, increase ROM, increase tissue extensibility, decrease trigger points and increase postural awareness to improve patient's ability to perform functional activities and decrease pain. 8 min Gait Trainin' x 10 reps with SPC, Heel toe gait   Rationale:      decrease pain, increase ROM, increase tissue extensibility and increase postural awareness to improve patient's ability to perform functional activities and decrease pain. 1 min Patient Education:  YES  Reviewed HEP   []  Progressed/Changed HEP based on: Other Objective/Functional Measures:    Gait training with SPC  Excessive left toe out at toe off. Improved slightly with cueing for heel toe gait pattern. Pt able to ambulate with step-through gait pattern with minimal cueing (S). Added standing TKE with RTB. Tactile cueing required at hips to prevent excessive ancillary hip movement. Initiated hip 3 way 2 x 5 x 3\"   Post Treatment Pain Level (on 0 to 10) scale:   4  / 10     ASSESSMENT    Assessment/Changes in Function:     Improved knee ROM since IE. Continues to required assist for supine SLR. []  See Progress Note/Recertification   Patient will continue to benefit from skilled PT services to modify and progress therapeutic interventions, address functional mobility deficits, address ROM deficits, address strength deficits, analyze and address soft tissue restrictions, analyze and cue movement patterns, analyze and modify body mechanics/ergonomics, assess and modify postural abnormalities, address imbalance/dizziness and instruct in home and community integration to attain remaining goals. Progress toward goals / Updated goals: · Short Term Goals: To be accomplished in  2  weeks:  1.   Pt will be educated in appropriate HEP to decrease pain, increase ROM/strength and return pt to PLOF.    2. Pt will increase left knee AROM to (-2) to 75 in order to promote normalized gait pattern. Manual therapy, Added TKE  3. Pt will improve FOTO score to >/= 47 to demonstrate a significant improvement in functional activity tolerance. 4. Pt will progress to ambulation in home with use of SPC to promote increased independence with ambulation. Gait training with [facility] today       PLAN    [x]  Upgrade activities as tolerated YES Continue plan of care   []  Discharge due to :    []  Other:      Therapist: Jayson Ballesteros DPT, Cert.  DN    Date: 2/26/2018 Time: 8:37 AM     Future Appointments  Date Time Provider Elayne Vickie   2/26/2018 9:00 AM Anastacio HARPER [facility]   3/1/2018 11:30 AM Cesar Dunbar, PT [facility]   3/2/2018 8:00 AM Anastacio HARPER [facility]   3/5/2018 8:30 AM Fidencio HARPER [facility]   3/7/2018 8:30 AM Fidencio HARPER [facility]   3/9/2018 8:30 AM Cesar Dunbar, PT [facility]   3/12/2018 8:30 AM Cesar Dunbar, PT [facility]   3/14/2018 8:30 AM Anastacio Louberenice HARPER [facility]   3/16/2018 8:30 AM Cesar Dunbar, PT [facility]   3/19/2018 8:30 AM Cesar Dunbar, PT [facility]   3/21/2018 8:30 AM Fidencio LEROY [facility]   3/23/2018 8:30 AM Fidencio HARPER [facility]   3/26/2018 8:30 AM Fidencio LEROY [facility]   3/28/2018 8:30 AM Cesar Dunbar PT [facility]   3/30/2018 8:30 AM Cesar Dunbar, PT [facility]

## 2018-03-01 ENCOUNTER — HOSPITAL ENCOUNTER (OUTPATIENT)
Dept: PHYSICAL THERAPY | Age: 58
Discharge: HOME | End: 2018-03-01
Payer: COMMERCIAL

## 2018-03-01 PROCEDURE — 97140 MANUAL THERAPY 1/> REGIONS: CPT

## 2018-03-01 PROCEDURE — 97014 ELECTRIC STIMULATION THERAPY: CPT

## 2018-03-01 PROCEDURE — 97110 THERAPEUTIC EXERCISES: CPT

## 2018-03-01 NOTE — PROGRESS NOTES
PHYSICAL THERAPY - DAILY TREATMENT NOTE    Patient Name: Danie Quintanilla        Date: 3/1/2018  : 1960   YES Patient  Verified  Visit #:   5     Insurance: Payor: Karin List / Plan: [facility] PPO / Product Type: PPO /      In time: 11:20early Out time: 12:20   Total Treatment Time: 60     TREATMENT AREA = Left knee pain [M25.562]    SUBJECTIVE    Pain Level (on 0 to 10 scale):  2  / 10   Medication Changes/New allergies or changes in medical history, any new surgeries or procedures? NO    If yes, update Summary List   Subjective Functional Status/Changes:  []  No changes reported     \"It's coming along. I feel like this is healing much quicker than my Right Knee.  \"          OBJECTIVE  Therapeutic Procedures:  Min Procedure Specifics + Rationale   n/a [x]  Patient Education (performed throughout session) [x] Review HEP    [] Progressed/Changed HEP based on:   [] proper performance and advancement of Therex/TA   [] reduction in pain level    [] increased functional capacity       [] change in directional preference   40 [x] Therapeutic Exercise   [x]  See Flowsheet   Rationale: increase ROM and increase strength to improve the patients ability to participate in ADL's    10 []  Manual Therapy [] STM/DTM     [] IASTM     [] Scar Massage  [] X-friction       [] PNF         [] PROM    [] TDN (see objective; actual needle insertion time not billed)   [] Soft tissue mobz   [] Joint mobz: Gr I [] II []  III [] IV[] V[]:  Involved Knee Patellar G2-G4 mobs inferior/medial, STM to quads/HS/ITB interface, Ant/Post G3-G4 Tibofemoral glides, PROM flexion/Ext contract/relax  Rationale: decrease pain, increase ROM, increase tissue extensibility, decrease trigger points and increase postural awareness to attain functional use and participation with ADL's     Modality rationale: decrease inflammation, decrease pain, increase tissue extensibility and increase muscle contraction/control to improve the patients ability to perform ADL's with greater ease   Min Type Additional Details   10 [x] E-Stim:       [] Att                 [x] Unatt                                [] Premod         [] [place]                          [] NMES           [] TENS instruct                          [] EDN: Location: Left quadriceps  [x] supine              [] prone  [] legs elevatedv   [] legs flat  []w/heat  [x]w/ice  []w/US    [] Skin assessment post-treatment:  []intact []redness- no adverse reaction       []redness  adverse reaction:     Other Objective/Functional Measures:    Increased reps/sets/resistance per flow sheet. Added new therex per flow sheet. Post Treatment Pain Level (on 0 to 10) scale:   2  / 10     ASSESSMENT    Assessment/Changes in Function:     Improved gait demo'd with [facility]   []  See Progress Note/ Recertification  []  See Discharge Summary        Patient will continue to benefit from skilled PT services to modify and progress therapeutic interventions, address functional mobility deficits, address ROM deficits, address strength deficits, analyze and address soft tissue restrictions, analyze and cue movement patterns, analyze and modify body mechanics/ergonomics and instruct in home and community integration  to attain remaining goals   Progress toward goals / Updated goals:    Maintaining compliance in performing HEP     PLAN    [x]  Upgrade activities as tolerated  [x]  Update interventions per flow sheet YES Continue plan of care   []  Discharge due to :    []  Other:      Therapist: Penny Mosley \"BJ\" Yumiko Caro, DPT, Cert. MDT, Cert. DN, Cert.  SMT    Date: 3/1/2018 Time: 11:52 AM   Future Appointments  Date Time Provider Elayne Johnston   3/2/2018 8:00 AM Bhavna Lepe [facility]   3/5/2018 8:30 AM Bhavna Lepe [facility]   3/7/2018 8:30 AM Bhavna Barnes [facility]   3/9/2018 8:30 AM Carry Balloon, PT [facility]   3/12/2018 8:30 AM Carry Balloon, PT [facility]   3/14/2018 8:30 AM Huggins [facility]   3/16/2018 8:30 AM Nick Montalvo PT [facility]   3/19/2018 8:30 AM Nick Montalvo PT [facility]   3/21/2018 8:30 AM Rowan HARPER [facility]   3/23/2018 8:30 AM Fidencio HARPER [facility]   3/26/2018 8:30 AM Galo Arellano [facility]   3/28/2018 8:30 AM Nick Montalvo PT [facility]   3/30/2018 8:30 AM Nick Montalvo PT [facility]

## 2018-03-02 ENCOUNTER — HOSPITAL ENCOUNTER (OUTPATIENT)
Dept: PHYSICAL THERAPY | Age: 58
Discharge: HOME | End: 2018-03-02
Payer: COMMERCIAL

## 2018-03-02 PROCEDURE — 97110 THERAPEUTIC EXERCISES: CPT

## 2018-03-02 PROCEDURE — 97014 ELECTRIC STIMULATION THERAPY: CPT

## 2018-03-02 NOTE — PROGRESS NOTES
PHYSICAL THERAPY - DAILY TREATMENT NOTE    Patient Name: Jonathan Mccormick        Date: 3/2/2018  : 1960   YES Patient  Verified  Visit #:     Insurance: Payor: Giovanna Ice / Plan: [facility] PPO / Product Type: PPO /      In time: 8:30 Out time: 9:20   Total Treatment Time: 50     TREATMENT AREA = Left knee pain [M25.562]    SUBJECTIVE    Pain Level (on 0 to 10 scale):  2  / 10   Medication Changes/New allergies or changes in medical history, any new surgeries or procedures? NO    If yes, update Summary List   Subjective Functional Status/Changes:  []  No changes reported     \"It feels good.  Just tight as usual. \"          OBJECTIVE  Therapeutic Procedures:  Min Procedure Specifics + Rationale   n/a [x]  Patient Education (performed throughout session) [x] Review HEP    [] Progressed/Changed HEP based on:   [] proper performance and advancement of Therex/TA   [] reduction in pain level    [] increased functional capacity       [] change in directional preference   40 [x] Therapeutic Exercise   [x]  See Flowsheet   Rationale: increase ROM and increase strength to improve the patients ability to participate in ADL's      Modality rationale: decrease inflammation, decrease pain, increase tissue extensibility and increase muscle contraction/control to improve the patients ability to perform ADL's with greater ease   Min Type Additional Details   10 [x] E-Stim:       [x] Att                 [] Unatt                                [] Premod         [x] [place]                          [] NMES           [] TENS instruct                          [] EDN: Location: Left Quadriceps MM  [x] supine              [] prone  [] legs elevatedv   [] legs flat  []w/heat  [x]w/ice  []w/US    [] Skin assessment post-treatment:  []intact []redness- no adverse reaction       []redness  adverse reaction:     Other Objective/Functional Measures:    Increased reps/sets/resistance per flow sheet.  TC for manual due to tardiness. Patient given new calendar to confirm appointment times. Post Treatment Pain Level (on 0 to 10) scale:   2  / 10     ASSESSMENT    Assessment/Changes in Function:     Report of muscular   []  See Progress Note/ Recertification  []  See Discharge Summary        Patient will continue to benefit from skilled PT services to modify and progress therapeutic interventions, address functional mobility deficits, address ROM deficits, address strength deficits, analyze and address soft tissue restrictions, analyze and cue movement patterns and instruct in home and community integration  to attain remaining goals   Progress toward goals / Updated goals:    Continuing to improve in gait pattern     PLAN    [x]  Upgrade activities as tolerated  [x]  Update interventions per flow sheet YES Continue plan of care   []  Discharge due to :    []  Other:      Therapist: Penny Mosley \"BJ\" Yumiko Caro, DPT, Cert. MDT, Cert. DN, Cert.  SMT    Date: 3/2/2018 Time: 8:53 AM   Future Appointments  Date Time Provider Elayne Johnston   3/5/2018 8:30 AM Huggins [facility]   3/7/2018 8:30 AM Huggins [facility]   3/9/2018 8:30 AM Carry Balloon, [facility]   3/12/2018 8:30 AM Carry Balloon, [facility]   3/14/2018 8:30 AM Huggins [facility]   3/16/2018 8:30 AM Carry Balloon, [facility]   3/19/2018 8:30 AM Carry Balloon, PT [facility]   3/21/2018 8:30 AM Davis HARPER [facility]   3/23/2018 8:30 AM Fidencio HARPER [facility]   3/26/2018 8:30 AM Davis HARPER [facility]   3/28/2018 8:30 AM Carry Balloon, [facility]   3/30/2018 8:30 AM Carry Balloon, [facility]

## 2018-03-05 ENCOUNTER — HOSPITAL ENCOUNTER (OUTPATIENT)
Dept: PHYSICAL THERAPY | Age: 58
Discharge: HOME | End: 2018-03-05
Payer: COMMERCIAL

## 2018-03-05 PROCEDURE — 97014 ELECTRIC STIMULATION THERAPY: CPT

## 2018-03-05 PROCEDURE — 97110 THERAPEUTIC EXERCISES: CPT

## 2018-03-05 PROCEDURE — 97140 MANUAL THERAPY 1/> REGIONS: CPT

## 2018-03-05 NOTE — PROGRESS NOTES
PHYSICAL THERAPY - DAILY TREATMENT NOTE      Patient Name: Jonathan Mccormick        Date: 3/5/2018  : 1960   YES Patient  Verified  Visit #:   7     Insurance: Payor: Giovanna Ice / Plan: [facility] PPO / Product Type: PPO /      In time: 8:23 Out time: 9:20   Total Treatment Time: 57 min       TREATMENT AREA =  Left knee pain [M25.562]    SUBJECTIVE    Pain Level (on 0 to 10 scale):  \"stiff\"  / 10   Medication Changes/New allergies or changes in medical history, any new surgeries or procedures? NO    If yes, update Summary List   Subjective Functional Status/Changes:  []  No changes reported     \"I think I'm getting better on the cane. \"        OBJECTIVE    Modalities Rationale:   decrease inflammation, decrease pain and increase muscle contraction/control to improve patient's ability to perform ADLs and self care duties with increased ease  10 min [] Estim, type/location: [place] to quad (VM) supine with small towel under knee. 10on/20off.                                      []  att     [x]  unatt     []  w/US     [x]  w/ice    []  w/heat    min []  Mechanical Traction: type/lbs                   []  pro   []  sup   []  int   []  cont    []  before manual    []  after manual    min []  Ultrasound, settings/location:      min []  Iontophoresis w/ dexamethasone, location:                                               []  take home patch       []  in clinic    min []  Ice     []  Heat    location/position:     min []  Vasopneumatic Device, press/temp:     min []  Other:    [] Skin assessment post-treatment (if applicable):    [x]  intact    []  redness- no adverse reaction     []redness  adverse reaction:      39 min Therapeutic Exercise:  [x]  See flow sheet   Rationale:      increase ROM, increase strength, improve coordination, improve balance and increase proprioception to improve the patients ability to increase pt's stability/mobility and improve functional activity and ability to perform ADL's     8 min Manual Therapy: STM/DTM to hamstrings RLE bilateral joint line   Rationale:      decrease pain, increase ROM, increase tissue extensibility, decrease trigger points and increase postural awareness to improve patient's ability to perform functional activities and decrease pain. 1 min Patient Education:  YES  Reviewed HEP   []  Progressed/Changed HEP based on: Other Objective/Functional Measures:    AROM//PROM  Ext: 8 deg//6 deg    TTP VL/ITB complex distally near supralateral patellar border     Post Treatment Pain Level (on 0 to 10) scale:   2  / 10     ASSESSMENT    Assessment/Changes in Function:     Report of muscular fatigue post therex  []  See Progress Note/ Recertification  []  See Discharge Summary     []  See Progress Note/Recertification   Patient will continue to benefit from skilled PT services to modify and progress therapeutic interventions, address functional mobility deficits, address ROM deficits, address strength deficits, analyze and address soft tissue restrictions, analyze and cue movement patterns, analyze and modify body mechanics/ergonomics, assess and modify postural abnormalities, address imbalance/dizziness and instruct in home and community integration to attain remaining goals. Progress toward goals / Updated goals:    Continuing to improve in gait pattern     PLAN    [x]  Upgrade activities as tolerated YES Continue plan of care   []  Discharge due to :    []  Other:      Therapist: Kendal Laboy DPT, Cert.  DN    Date: 3/5/2018 Time: 7:37 AM     Future Appointments  Date Time Provider Elayne Johnston   3/5/2018 8:30 AM [facility]   3/7/2018 8:30 AM [facility]   3/9/2018 8:30 AM Martha Balbuena PT [facility]   3/12/2018 8:30 AM Thenmadonna Balbuena PT [facility]   3/14/2018 8:30 AM [facility]   3/16/2018 8:30 AM Thenmadonna Balbuena PT [facility]   3/19/2018 8:30 AM Martha Balbuena PT [facility] 3/21/2018 8:30 AM Fidencio Mccracken [facility]   3/23/2018 8:30 AM Fidencio Mccracken [facility]   3/26/2018 8:30 AM Sabrina Flynn [facility]   3/28/2018 8:30 AM Isreal Wolfe [facility]   3/30/2018 8:30 AM Sabrina Flynn [facility]

## 2018-03-07 ENCOUNTER — HOSPITAL ENCOUNTER (OUTPATIENT)
Dept: PHYSICAL THERAPY | Age: 58
Discharge: HOME | End: 2018-03-07
Payer: COMMERCIAL

## 2018-03-07 PROCEDURE — 97014 ELECTRIC STIMULATION THERAPY: CPT

## 2018-03-07 PROCEDURE — 97110 THERAPEUTIC EXERCISES: CPT

## 2018-03-07 PROCEDURE — 97140 MANUAL THERAPY 1/> REGIONS: CPT

## 2018-03-07 NOTE — PROGRESS NOTES
PHYSICAL THERAPY - DAILY TREATMENT NOTE      Patient Name: Severa Curet        Date: 3/7/2018  : 1960   YES Patient  Verified  Visit #:     Insurance: Payor: Demetrius Kramer / Plan: [facility] PPO / Product Type: PPO /       In time: 8:30 Out time: 9:33   Total Treatment Time: 63 min       TREATMENT AREA =  Left knee pain [M25.562]    SUBJECTIVE    Pain Level (on 0 to 10 scale):  3  / 10   Medication Changes/New allergies or changes in medical history, any new surgeries or procedures? NO    If yes, update Summary List   Subjective Functional Status/Changes:  []  No changes reported     Patient reports increased pain after having to drive granddaughter to school yesterday. OBJECTIVE    Modalities Rationale:   decrease edema, decrease inflammation and decrease pain to improve patient's ability to perform ADLs and self care duties with increased ease  10 min [x] Estim, type/location: Seated to left knee, heel propped for full ext LLPS.  IFC to left knee                                     []  att     []  unatt     []  w/US     [x]  w/ice    []  w/heat    min []  Mechanical Traction: type/lbs                   []  pro   []  sup   []  int   []  cont    []  before manual    []  after manual    min []  Ultrasound, settings/location:      min []  Iontophoresis w/ dexamethasone, location:                                               []  take home patch       []  in clinic    min []  Ice     []  Heat    location/position: Seated to left knee, heel propped for full ext LLPS    min []  Vasopneumatic Device, press/temp:     min []  Other:    [] Skin assessment post-treatment (if applicable):    [x]  intact    []  redness- no adverse reaction     []redness  adverse reaction:      43 min Therapeutic Exercise:  [x]  See flow sheet   Rationale:      increase ROM, increase strength, improve coordination, improve balance and increase proprioception to improve the patients ability to increase pt's stability/mobility and improve functional activity and ability to perform ADL's    10 min Manual Therapy: Grade 4 knee extension mobs, AAROM heel slides with therapist overpressure   Rationale:      decrease pain, increase ROM, increase tissue extensibility, decrease trigger points and increase postural awareness to improve patient's ability to perform functional activities and decrease pain. 1 min Patient Education:  YES  Reviewed HEP   []  Progressed/Changed HEP based on: Added knee flexion stretch at steps     Other Objective/Functional Measures:    Knee AROM:  Extension: -8 deg  Flexion: 85 deg    Knee AAROM flexion with heel slide 91 degrees with increased reps    Added knee flexion stretch at steps (captain morgains). Achieved 97 degrees knee flexion    Mild-moderate quad lag with supine SLR     Post Treatment Pain Level (on 0 to 10) scale:   2-3  / 10     ASSESSMENT    Assessment/Changes in Function:     Knee ROM progressing steadily but slowly (see objective above). Quad strength improving but still has quad lag with SLR. Ambulating with SPC and moderate gait deficits. []  See Progress Note/Recertification   Patient will continue to benefit from skilled PT services to modify and progress therapeutic interventions, address functional mobility deficits, address ROM deficits, address strength deficits, analyze and address soft tissue restrictions, analyze and cue movement patterns, analyze and modify body mechanics/ergonomics, assess and modify postural abnormalities, address imbalance/dizziness and instruct in home and community integration to attain remaining goals. Progress toward goals / Updated goals: · Short Term Goals: To be accomplished in  2  weeks:  1. Pt will be educated in appropriate HEP to decrease pain, increase ROM/strength and return pt to PLOF.    2. Pt will increase left knee AROM to (-2) to 75 in order to promote normalized gait pattern  3.  Pt will improve FOTO score to >/= 47 to demonstrate a significant improvement in functional activity tolerance. 4. Pt will progress to ambulation in home with use of SPC to promote increased independence with ambulation. PLAN    [x]  Upgrade activities as tolerated YES Continue plan of care   []  Discharge due to :    []  Other:      Therapist: Sayda Bishop DPT, Cert.  DN    Date: 3/7/2018 Time: 7:48 AM     Future Appointments  Date Time Provider Elayne Johnston   3[facility]2018 [facility]   3/9/2018 8:30 AM [facility]   3/12/2018 8:30 AM [facility]   3/14/2018 8:30 AM [facility]   3/16/2018 8:30 AM [facility]   3/19/2018 8:30 [facility]   3/21/2018 8:30 AM [facility]   3/23/2018 8:30 AM [facility]   3/26/2018 8:30 AM [facility]   3/28/2018 8:30 AM [facility]   3/30/2018 8:30 [facility]

## 2018-03-09 ENCOUNTER — HOSPITAL ENCOUNTER (OUTPATIENT)
Dept: PHYSICAL THERAPY | Age: 58
Discharge: HOME | End: 2018-03-09
Payer: COMMERCIAL

## 2018-03-09 PROCEDURE — 97140 MANUAL THERAPY 1/> REGIONS: CPT

## 2018-03-09 PROCEDURE — 97110 THERAPEUTIC EXERCISES: CPT

## 2018-03-09 NOTE — PROGRESS NOTES
PHYSICAL THERAPY - DAILY TREATMENT NOTE    Patient Name: Dorcas Rodgers        Date: 3/9/2018  : 1960   YES Patient  Verified  Visit #:     Insurance: Payor: De Queen Cola / Plan: [facility] PPO / Product Type: PPO /      In time: 8:35 Out time: 9:42   Total Treatment Time: 68     TREATMENT AREA = Left knee pain [M25.562]    SUBJECTIVE    Pain Level (on 0 to 10 scale):  3  / 10   Medication Changes/New allergies or changes in medical history, any new surgeries or procedures? NO    If yes, update Summary List   Subjective Functional Status/Changes:  []  No changes reported     \"Vivek worked me last time pushing on it.  \"          OBJECTIVE  Therapeutic Procedures:  Min Procedure Specifics + Rationale   n/a [x]  Patient Education (performed throughout session) [x] Review HEP    [] Progressed/Changed HEP based on:   [] proper performance and advancement of Therex/TA   [] reduction in pain level    [] increased functional capacity       [] change in directional preference   48 [x] Therapeutic Exercise   [x]  See Flowsheet   Rationale: increase ROM and increase strength to improve the patients ability to participate in ADL's    10 []  Manual Therapy [] STM/DTM     [] IASTM     [] Scar Massage  [] X-friction       [] PNF         [] PROM    [] TDN (see objective; actual needle insertion time not billed)   [] Soft tissue mobz   [] Joint mobz: Gr I [] II []  III [] IV[] V[]:  Involved Knee Patellar G2-G4 mobs inferior/medial, STM to quads/HS/ITB interface, Ant/Post G3-G4 Tibofemoral glides, PROM flexion/Ext contract/relax  Rationale: decrease pain, increase ROM, increase tissue extensibility, decrease trigger points and increase postural awareness to attain functional use and participation with ADL's     Modality rationale: decrease inflammation, decrease pain, increase tissue extensibility and increase muscle contraction/control to improve the patients ability to perform ADL's with greater ease   Min Type Additional Details   10 [x]  Cold Pack  [] pre-REBECCA          [x] post-REBECCA  []  Ice massage Location:left knee  [] supine              [] prone  [x] legs elevated    [] legs flat   [] Skin assessment post-treatment:  []intact []redness- no adverse reaction       []redness  adverse reaction:     Other Objective/Functional Measures:    AROM extension = 3 degrees, PROM = 1 degree     Post Treatment Pain Level (on 0 to 10) scale:   2  / 10     ASSESSMENT    Assessment/Changes in Function:     Significant improvement in extension since last visit   []  See Progress Note/ Recertification  []  See Discharge Summary        Patient will continue to benefit from skilled PT services to modify and progress therapeutic interventions, address functional mobility deficits, address ROM deficits, address strength deficits, analyze and address soft tissue restrictions, analyze and cue movement patterns, analyze and modify body mechanics/ergonomics and instruct in home and community integration  to attain remaining goals   Progress toward goals / Updated goals:    Progressing well in attaining full extension     PLAN    [x]  Upgrade activities as tolerated  [x]  Update interventions per flow sheet YES Continue plan of care   []  Discharge due to :    []  Other:      Therapist: Toshia Rasmussen \"BJ\" Kendra Dominguez, DPT, Cert. MDT, Cert. DN, Cert.  SMT    Date: 3/9/2018 Time: 8:54 AM   Future Appointments  Date Time Provider Elayne Johnston   3/12/2018 8:30 AM Andi Brain, [facility]   3/14/2018 8:30 AM Gattis Brain, PT [facility]   3/16/2018 8:30 AM Gattis Brain, PT [facility]   3/19/2018 8:30 AM Gattis Brain, PT [facility]   3/21/2018 8:30 AM [facility]   3/23/2018 8:30 AM [facility]   3/26/2018 8:30 AM [facility]   3/28/2018 8:30 AM Elizabethtis Brain, [facility]   3/30/2018 8:30 AM [facility]

## 2018-03-12 ENCOUNTER — HOSPITAL ENCOUNTER (OUTPATIENT)
Dept: PHYSICAL THERAPY | Age: 58
Discharge: HOME | End: 2018-03-12
Payer: COMMERCIAL

## 2018-03-12 PROCEDURE — 97530 THERAPEUTIC ACTIVITIES: CPT

## 2018-03-12 PROCEDURE — 97014 ELECTRIC STIMULATION THERAPY: CPT

## 2018-03-12 PROCEDURE — 97110 THERAPEUTIC EXERCISES: CPT

## 2018-03-12 PROCEDURE — 97140 MANUAL THERAPY 1/> REGIONS: CPT

## 2018-03-12 NOTE — PROGRESS NOTES
Jamari Gonzalez [address]  [facility] PHYSICAL THERAPY  [address] [facility] [address] [address], [address] - Phone: [phone]  Fax: [phone]  PROGRESS NOTE  Patient Name: Italo Meehan : 1960   Treatment/Medical Diagnosis: Left knee pain [M25.562]   Referral Source: Fidencio Maki MD     Date of Initial Visit: 18 Attended Visits: 10 Missed Visits: 0     SUMMARY OF TREATMENT  Patient's POC has consisted of therex, therapeutic activities, manual therapy prn, modalities prn, pt. education, and a comprehensive HEP. Treatment strategies used to address functional mobility deficits, ROM deficits, strength deficits, analyze and address soft tissue restrictions, analyze and cue movement patterns, analyze and modify body mechanics/ergonomics, assess and modify postural abnormalities and instruct in home and community integration. CURRENT STATUS  Patient making steady progress in ROM, and now demonstrates AROM 1-90 degrees (previously -10-62 degrees) and PROM 0-95 degrees. He  (previously -9-65 degrees). Patient had previously maintained lower levels of pain (2-3) until recent exacerbation when he celebrated his grand-niece's christening. Patient reports increased ache to his knee which woke him up at 4am with 8/10 pain. He was able to participate in today's treatment and reduce symptoms to 2/10 upon completion. Goal/Measure of Progress Goal Met? 1. Pt will be educated in appropriate HEP to decrease pain, increase ROM/strength and return pt to PLOF.    2. Pt will increase left knee AROM to (-2) to 75 in order to promote normalized gait pattern  3. Pt will improve FOTO score to >/= 47 to demonstrate a significant improvement in functional activity tolerance. 4. Pt will progress to ambulation in home with use of SPC to promote increased independence with ambulation. MET      MET      Ongoing      MET     New Goals to be achieved in __4-6__  weeks:  1.  Pt will improve FOTO score to >/= 55 to demo a significant improvement in functional activity tolerance. 2. Patient will increase R knee strength in flexion and extension to 4+/5 so patient has improved ability to perform work duties. 3. Patient will demonstrate good eccentric quad control in lateral step down off 6 inch step with no compensation to facilitate normalized gait pattern for stair negotiation. 4. Pt will demonstrate L knee AROM 0 to 120 degrees to promote reciprocal stair negotiation. Frequency / Duration:   Patient to be seen  2-3  times per week for 4-6  weeks:    G-Codes: n/a  RECOMMENDATIONS  Continue and progress functional therex/therapeutic activity as able, utilizing manual therapy and modalities prn. Progress patient towards independent HEP to facilitate self-management of symptoms and progress gains after PT. If you have any questions/comments please contact us directly at [phone]. Thank you for allowing us to assist in the care of your patient. Therapist Signature: Salo Teague \"BJ\" Con Nolan, JANAE, Cert. MDT, CertFarhad DN, Cert. SMT Date: 1/12/4223   Certification Period: n/a     Reporting Period n/a   Time: 9:05 AM   NOTE TO PHYSICIAN:  PLEASE COMPLETE THE ORDERS BELOW AND FAX TO   [facility]: [phone]. If you are unable to process this request in 24 hours please contact our office: [phone].    ___ I have read the above report and request that my patient continue as recommended.   ___ I have read the above report and request that my patient continue therapy with the following changes/special instructions:_________________________________________________________   ___ I have read the above report and request that my patient be discharged from therapy.      Physician Signature:        Date:       Time:

## 2018-03-12 NOTE — PROGRESS NOTES
PHYSICAL THERAPY - DAILY TREATMENT NOTE    Patient Name: Candace Silva        Date: 3/12/2018  : 1960   YES Patient  Verified  Visit #:   10   of   16-24  Insurance: Payor: Elgin Palacios / Plan: [facility] PPO / Product Type: PPO /      In time: 8:33 Out time: 9:16   Total Treatment Time: 46     TREATMENT AREA = Left knee pain [M25.562]    SUBJECTIVE    Pain Level (on 0 to 10 scale):  8  / 10   Medication Changes/New allergies or changes in medical history, any new surgeries or procedures? NO    If yes, update Summary List   Subjective Functional Status/Changes:  []  No changes reported     \"Woke up this morning with it throbbing and stiff. I think I overdid it yesterday. We had a christening and Buddhist for my great niece and we'd been doing a lot all day.  \"          OBJECTIVE  Therapeutic Procedures:  Min Procedure Specifics + Rationale   n/a [x]  Patient Education (performed throughout session) [x] Review HEP    [] Progressed/Changed HEP based on:   [] proper performance and advancement of Therex/TA   [] reduction in pain level    [] increased functional capacity       [] change in directional preference   10 [x] Therapeutic Exercise   [x]  See Flowsheet   Rationale: increase ROM and increase strength to improve the patients ability to participate in ADL's    10 []  Manual Therapy [] STM/DTM     [] IASTM     [] Scar Massage  [] X-friction       [] PNF         [] PROM    [] TDN (see objective; actual needle insertion time not billed)   [] Soft tissue mobz   [] Joint mobz: Gr I [] II []  III [] IV[] V[]:  Involved Knee Patellar G2-G4 mobs inferior/medial, STM to quads/HS/ITB interface, Ant/Post G3-G4 Tibofemoral glides, PROM flexion/Ext contract/relax  Rationale: decrease pain, increase ROM, increase tissue extensibility, decrease trigger points and increase postural awareness to attain functional use and participation with ADL's   8 [x] Therapeutic Activity   [x]  See Flowsheet; Re-assessment of functional mobility, ROM, and Strength  Rationale: To improve safety, proprioception, coordination, and efficiency with tasks     Modality rationale: decrease inflammation, decrease pain, increase tissue extensibility and increase muscle contraction/control to improve the patients ability to perform ADL's with greater ease   Min Type Additional Details   15 [x] E-Stim:       [] Att                 [] Unatt                                [] Premod         [] IFC                          [] NMES           [] TENS instruct                          [] EDN: Location: Left Knee  [] supine              [] prone  [] legs elevatedv   [] legs flat  []w/heat  []w/ice  []w/US        Other Objective/Functional Measures:    See PN     Post Treatment Pain Level (on 0 to 10) scale:   2-3  / 10     ASSESSMENT    Assessment/Changes in Function:      [x]  See Progress Note/ Recertification  []  See Discharge Summary        Patient will continue to benefit from skilled PT services to modify and progress therapeutic interventions, address functional mobility deficits, address ROM deficits, address strength deficits, analyze and address soft tissue restrictions, analyze and cue movement patterns, analyze and modify body mechanics/ergonomics and instruct in home and community integration  to attain remaining goals   Progress toward goals / Updated goals:    See PN     PLAN    [x]  Upgrade activities as tolerated  [x]  Update interventions per flow sheet YES Continue plan of care   []  Discharge due to :    []  Other:      Therapist: Christiano Connell \"BJ\" Ajith Roy DPT, Cert. MDT, Cert. DN, Cert.  SMT    Date: 3/12/2018 Time: 8:45 AM   Future Appointments  Date Time Provider Elayne Johnston   3/14/2018 8:30 AM Paola Ac PT [address]   3/16/2018 8:30 AM Paola Ac PT [address]   3/19/2018 8:30 AM aPola Ac PT [address]   3/21/2018 8:30 AM Matt [address]   3/23/2018 8:30 AM Matt [address] 3/26/2018 8:30 AM [facility]   3/28/2018 8:30 AM Samra Vargas PT [facility]   3/30/2018 8:30 AM [facility]

## 2018-03-14 ENCOUNTER — HOSPITAL ENCOUNTER (OUTPATIENT)
Dept: PHYSICAL THERAPY | Age: 58
Discharge: HOME | End: 2018-03-14
Payer: COMMERCIAL

## 2018-03-14 PROCEDURE — 97110 THERAPEUTIC EXERCISES: CPT

## 2018-03-14 PROCEDURE — 97014 ELECTRIC STIMULATION THERAPY: CPT

## 2018-03-14 PROCEDURE — 97530 THERAPEUTIC ACTIVITIES: CPT

## 2018-03-14 NOTE — PROGRESS NOTES
PHYSICAL THERAPY - DAILY TREATMENT NOTE    Patient Name: Odalys Serna        Date: 3/14/2018  : 1960   YES Patient  Verified  Visit #:     Insurance: Payor: Mercy Nicole / Plan: [facility] PPO / Product Type: PPO /      In time: 8:30 Out time: 9:40   Total Treatment Time: 70     TREATMENT AREA = Left knee pain [M25.562]    SUBJECTIVE    Pain Level (on 0 to 10 scale):  3  / 10   Medication Changes/New allergies or changes in medical history, any new surgeries or procedures? NO    If yes, update Summary List   Subjective Functional Status/Changes:  []  No changes reported     \"I'm a whole lot better than the other day. \"          OBJECTIVE  Therapeutic Procedures:  Min Procedure Specifics + Rationale   n/a [x]  Patient Education (performed throughout session) [x] Review HEP    [] Progressed/Changed HEP based on:   [] proper performance and advancement of Therex/TA   [] reduction in pain level    [] increased functional capacity       [] change in directional preference   45 [x] Therapeutic Exercise   [x]  See Flowsheet   Rationale: increase ROM and increase strength to improve the patients ability to participate in ADL's    10 [x] Therapeutic Activity   [x]  See Flowsheet;   SLS, march/retro/sidestep  Rationale:  To improve safety, proprioception, coordination, and efficiency with tasks     Modality rationale: decrease inflammation, decrease pain, increase tissue extensibility and increase muscle contraction/control to improve the patients ability to perform ADL's with greater ease   Min Type Additional Details   15 [x] E-Stim:       [] Att                 [] Unatt                                [] Premod         [] IFC                          [] NMES           [] TENS instruct                          [] EDN: Location: supine  [x] supine              [] prone  [x] legs elevatedv   [] legs flat  []w/heat  []w/ice  []w/US    [] Skin assessment post-treatment:  []intact []redness- no adverse reaction       []redness  adverse reaction:     Other Objective/Functional Measures:    therex revised per flow sheet  Educated pt on current swelling, use of pedometer to address conditioning and tolerance. Post Treatment Pain Level (on 0 to 10) scale:   0  / 10     ASSESSMENT    Assessment/Changes in Function:     Performed balance activities well.    []  See Progress Note/ Recertification  []  See Discharge Summary        Patient will continue to benefit from skilled PT services to modify and progress therapeutic interventions, address functional mobility deficits, address ROM deficits, address strength deficits, analyze and address soft tissue restrictions, analyze and cue movement patterns, analyze and modify body mechanics/ergonomics and instruct in home and community integration  to attain remaining goals   Progress toward goals / Updated goals:    1st session since progress note. No significant progress observed       PLAN    [x]  Upgrade activities as tolerated  [x]  Update interventions per flow sheet YES Continue plan of care   []  Discharge due to :    []  Other:      Therapist: Nena Amaro \"BJ\" JANAE Posey, Cert. MDT, Cert. DN, Cert.  SMT    Date: 3/14/2018 Time: 9:02 AM   Future Appointments  Date Time Provider Elayne Johnston   3/16/2018 8:30 AM Freda Wade, PT [facility]   3/19/2018 8:30 AM Freda Wade, PT [facility]   3/21/2018 8:30 AM [facility]   3/23/2018 8:30 AM [facility]   3/26/2018 8:30 AM [facility]   3/28/2018 8:30 AM Freda Wade, PT [facility]   3/30/2018 8:30 AM [facility]

## 2018-03-16 ENCOUNTER — HOSPITAL ENCOUNTER (OUTPATIENT)
Dept: PHYSICAL THERAPY | Age: 58
Discharge: HOME | End: 2018-03-16
Payer: COMMERCIAL

## 2018-03-16 PROCEDURE — 97530 THERAPEUTIC ACTIVITIES: CPT

## 2018-03-16 PROCEDURE — 97110 THERAPEUTIC EXERCISES: CPT

## 2018-03-16 PROCEDURE — 97014 ELECTRIC STIMULATION THERAPY: CPT

## 2018-03-16 NOTE — PROGRESS NOTES
PHYSICAL THERAPY - DAILY TREATMENT NOTE    Patient Name: Lilliana Jaramillo        Date: 3/16/2018  : 1960   YES Patient  Verified  Visit #:     Insurance: Payor: Lakshmi Painter / Plan: [facility] PPO / Product Type: PPO /      In time: 8:30 Out time: 9:30   Total Treatment Time: 60     TREATMENT AREA = Left knee pain [M25.562]    SUBJECTIVE    Pain Level (on 0 to 10 scale):  3  / 10   Medication Changes/New allergies or changes in medical history, any new surgeries or procedures? NO    If yes, update Summary List   Subjective Functional Status/Changes:  []  No changes reported     \"I'm still having a lot of trouble sleeping. I woke up hurting at about an 8/10. Night time's the worst for me. It also doesn't help that we're getting ready for a 5 day conference. \"          OBJECTIVE  Therapeutic Procedures:  Min Procedure Specifics + Rationale   n/a [x]  Patient Education (performed throughout session) [x] Review HEP    [] Progressed/Changed HEP based on:   [] proper performance and advancement of Therex/TA   [] reduction in pain level    [] increased functional capacity       [] change in directional preference   35 [x] Therapeutic Exercise   [x]  See Flowsheet   Rationale: increase ROM and increase strength to improve the patients ability to participate in ADL's    10 [x] Therapeutic Activity   [x]  See Flowsheet;   Zig Zag, in/out sidestep, in/out forward, sidestepping, HK  Rationale:  To improve safety, proprioception, coordination, and efficiency with tasks     Modality rationale: decrease inflammation, decrease pain, increase tissue extensibility and increase muscle contraction/control to improve the patients ability to perform ADL's with greater ease   Min Type Additional Details   15 [x] E-Stim:       [] Att                 [x] Unatt                                [] Premod         [x] IFC                          [] NMES           [] TENS instruct [] EDN: Location: L knee  [] supine              [] prone  [] legs elevatedv   [] legs flat  []w/heat  [x]w/ice  []w/US      Other Objective/Functional Measures:    Educated pt on sleeping posture as he tends to be a side sleeper and be in prone. Advised patient to sleep with pillow b/w knees   Discussed reducing frequency to 2x/week. Post Treatment Pain Level (on 0 to 10) scale:   0  / 10     ASSESSMENT    Assessment/Changes in Function:     Demonstrates good balance with progression of therex   []  See Progress Note/ Recertification  []  See Discharge Summary        Patient will continue to benefit from skilled PT services to modify and progress therapeutic interventions, address functional mobility deficits, address ROM deficits, address strength deficits, analyze and address soft tissue restrictions, analyze and cue movement patterns, analyze and modify body mechanics/ergonomics and instruct in home and community integration  to attain remaining goals   Progress toward goals / Updated goals:    Steady progress in functional mobility     PLAN    [x]  Upgrade activities as tolerated  [x]  Update interventions per flow sheet YES Continue plan of care   []  Discharge due to :    []  Other:      Therapist: Nena Amaro \"BJ\" America Merlos, DPT, Cert. MDT, Cert. DN, Cert.  SMT    Date: 3/16/2018 Time: 8:35 AM   Future Appointments  Date Time Provider Elayne Johnston   3/19/2018 5:00 PM Fabián Bal [facility]   3/21/2018 8:30 AM [facility]   3/23/2018 8:30 AM [facility]   3/26/2018 8:30 AM [facility]   3/28/2018 8:30 AM Freda Wade PT [facility]   3/30/2018 8:30 AM [facility]

## 2018-03-19 ENCOUNTER — HOSPITAL ENCOUNTER (OUTPATIENT)
Dept: PHYSICAL THERAPY | Age: 58
Discharge: HOME | End: 2018-03-19
Payer: COMMERCIAL

## 2018-03-19 PROCEDURE — 97530 THERAPEUTIC ACTIVITIES: CPT

## 2018-03-19 PROCEDURE — 97140 MANUAL THERAPY 1/> REGIONS: CPT

## 2018-03-19 PROCEDURE — 97110 THERAPEUTIC EXERCISES: CPT

## 2018-03-19 NOTE — PROGRESS NOTES
PT DAILY TREATMENT NOTE     Patient Name: tIalo Meehan  Date:3/19/2018  : 1960  [x]  Patient  Verified  Payor: BLUE CROSS / Plan: [facility] PPO / Product Type: PPO /    In time:1700  Out time:1805  Total Treatment Time (min): 65    Visit #: 13     Treatment Area: Left knee pain [M25.562]    SUBJECTIVE  Pain Level (0-10 scale): 2  Any medication changes, allergies to medications, adverse drug reactions, diagnosis change, or new procedure performed?: [x] No    [] Yes (see summary sheet for update)  Subjective functional status/changes:   [] No changes reported  Patient reports compliance with HEP, but admits not wearing compression stocking. 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 .. I didn't think about wearing it, but I will if you tell me I should. \" PT recommended compression stocking whenever swollen or when he will be standing > 15 min. OBJECTIVE  Modality rationale: decrease inflammation and decrease pain to improve the patients ability to improve functional mobility to safely ambulate over uneven terrain in the community.    Min Type Additional Details     E-Stim:          [] Att                 [] Unatt                                [] Premod         [] IFC                          [] NMES           [] TENS instruct                          [] Other: Dose:  ___  Hz X ___ V  Location:  [] supine              [] prone  [] legs elevatedv   [] legs flat  []w/heat  []w/ice  []w/US     Traction:         [] Cervical          []Lumbar                          [] Intermitent      []Continuous Ramp/ hold/ lbs:     [] supine              [] prone  [] legs elevated    [] legs flat     US:                []Continuous      [] Pulsed                         [] 1 MHz             [] 3MHz Location:    W/cm2:      Iontophoresis with dexamethasone                         [] 40 mA             [] 80 mA [] In clinic  [] Take home patch    Heat [] pre-REBECCA          [] post-REBECCA Location:    [] supine              [] prone  [] legs elevated    [] legs flat   10 Cold Pack     [] pre-REBECCA          [x] post-REBECCA  []  Ice massage Location: left knee   [x] supine              [] prone  [x] legs elevated    [] legs flat    Vasopneumatic Device Pressure:       [] lo [] med [] hi   Temperature: [] lo [] med [] hi   [] Skin assessment post-treatment:  []intact []redness- no adverse reaction       []redness  adverse reaction:         25 min Therapeutic Exercise [x]  See Flowsheet    Rationale: increase ROM, increase strength and improve coordination to improve the patients ability to improve functional mobility to safely ambulate over uneven terrain in the community. 15 min Therapeutic Activity [x]  See Flowsheet    Rationale: increase ROM, improve coordination, improve balance and increase proprioception to improve the patients ability to improve functional mobility to safely ambulate over uneven terrain in the community. 15 min Manual Therapy [] STM/DTM     [x] IASTM     [x] Scar Massage  [] X-friction       [] PNF         [] PROM  [] Soft tissue mobz:   [] Joint mobz:    Rationale: increase ROM, increase tissue extensibility and decrease edema  to perform sit<>stand transfers without pain increase or compensations. Patient Education [x] Review HEP    [] Progressed/Changed HEP based on:   [] positioning   [] body mechanics     [] transfers      [] heat/ice application       Other Objective/Functional Measures:  Good ecc w/ step-downs but still needs UE support on rails. Pain Level (0-10 scale) post treatment: 1    ASSESSMENT/Changes in Function: Patient tolerated treatment well. Providing good effort.      Patient will continue to benefit from skilled PT services to modify and progress therapeutic interventions, address functional mobility deficits, address ROM deficits, address strength deficits, analyze and address soft tissue restrictions, analyze and cue movement patterns, analyze and modify body mechanics/ergonomics, assess and modify postural abnormalities, address imbalance/dizziness and instruct in home and community integration to attain remaining goals. []  See Plan of Care  []  See Progress Note/ Recertification  []  See Discharge Summary         Progress towards goals / Updated goals:  New Goals to be achieved in __4-6__  weeks: 4/23/18  1. Pt will improve FOTO score to >/= 55 to demo a significant improvement in functional activity tolerance. 2. Patient will increase R knee strength in flexion and extension to 4+/5 so patient has improved ability to perform work duties. progressing  3. Patient will demonstrate good eccentric quad control in lateral step down off 6 inch step with no compensation to facilitate normalized gait pattern for stair negotiation. progressing  4. Pt will demonstrate L knee AROM 0 to 120 degrees to promote reciprocal stair negotiation.       PLAN  [x]  Continue plan of care  []  Upgrade activities as tolerated       []  Update interventions per flow sheet       []  Discharge due to:_  []  Other:_      Therapist:  Angelique Helm PT  Date:  3/19/2018  Time: 6:12 PM    Future Appointments  Date Time Provider Elayne Johnston   3/21/2018 8:30 AM [facility] AT [address]   3/23/2018 8:30 AM [address]   3/26/2018 8:30 AM [address]   3/28/2018 8:30 AM Aysha Guajardo PT [address]   3/30/2018 8:30 AM [address]

## 2018-03-21 ENCOUNTER — HOSPITAL ENCOUNTER (OUTPATIENT)
Dept: PHYSICAL THERAPY | Age: 58
Discharge: HOME | End: 2018-03-21
Payer: COMMERCIAL

## 2018-03-21 PROCEDURE — 97110 THERAPEUTIC EXERCISES: CPT

## 2018-03-21 PROCEDURE — 97530 THERAPEUTIC ACTIVITIES: CPT

## 2018-03-21 PROCEDURE — 97140 MANUAL THERAPY 1/> REGIONS: CPT

## 2018-03-23 ENCOUNTER — HOSPITAL ENCOUNTER (OUTPATIENT)
Dept: PHYSICAL THERAPY | Age: 58
Discharge: HOME | End: 2018-03-23
Payer: COMMERCIAL

## 2018-03-23 PROCEDURE — 97110 THERAPEUTIC EXERCISES: CPT

## 2018-03-23 PROCEDURE — 97530 THERAPEUTIC ACTIVITIES: CPT

## 2018-03-23 NOTE — PROGRESS NOTES
PHYSICAL THERAPY - DAILY TREATMENT NOTE      Patient Name: Danie Quintanilla        Date: 3/23/2018  : 1960   YES Patient  Verified  Visit #:   15   of   16-24  Insurance: Payor: Karin List / Plan: [facility] PPO / Product Type: PPO /      In time: 8:28 Out time: 9:33   Total Treatment Time: 65 min       TREATMENT AREA =  Left knee pain [M25.562]    SUBJECTIVE    Pain Level (on 0 to 10 scale):  2  / 10   Medication Changes/New allergies or changes in medical history, any new surgeries or procedures? NO    If yes, update Summary List   Subjective Functional Status/Changes:  []  No changes reported     \"I woke up really sore this morning. \"        OBJECTIVE    Modalities Rationale:   decrease edema, decrease inflammation, decrease pain and increase tissue extensibility to improve patient's ability to perform ADLs and self care duties with increased ease   min [] Estim, type/location:                                      []  att     []  unatt     []  w/US     []  w/ice    []  w/heat    min []  Mechanical Traction: type/lbs                   []  pro   []  sup   []  int   []  cont    []  before manual    []  after manual    min []  Ultrasound, settings/location:      min []  Iontophoresis w/ dexamethasone, location:                                               []  take home patch       []  in clinic   10 min [x]  Ice     []  Heat    location/position: Supine to R knee, heel propped for full ext LLPS    min []  Vasopneumatic Device, press/temp:     min []  Other:    [] Skin assessment post-treatment (if applicable):    [x]  intact    []  redness- no adverse reaction     []redness  adverse reaction:      40 min Therapeutic Exercise:  [x]  See flow sheet   Rationale:      increase ROM, increase strength, improve coordination, improve balance and increase proprioception to improve the patients ability to increase pt's stability/mobility and improve functional activity and ability to perform ADL's    15 min Therapeutic Activity: Sidestepping, hk march with SLS hold, F/L step ups at blue steps   Rationale:     decrease pain, increase ROM, increase tissue extensibility and increase postural awareness to improve patient's ability to improve ability to wean from [facility] to improve independence with ambulation      1 min Patient Education:  YES  Reviewed HEP   []  Progressed/Changed HEP based on: Other Objective/Functional Measures:    Pt ambulated into clinic today with SPC and resumed antalgic gait pattern although decreased RUE WB noted through cane. Pt ambulated throughout session without cane. HK forward walking with ball extended in front of body. Cued for 1\" hold with high march bilaterally. Difficulty noted with L SLS hold. Progressed standing hip abd/ext to YTB    Step ups performed with RUE hand rail assist. Slight antalgic pattern with forward lateral step ups    SLS 30\", 11\", 25\" on left LE     Post Treatment Pain Level (on 0 to 10) scale:   1-2  / 10     ASSESSMENT    Assessment/Changes in Function:     Patient progressed strengthening therex without complaints. Continues to demo improved ROM slowly but consistently. []  See Progress Note/Recertification   Patient will continue to benefit from skilled PT services to modify and progress therapeutic interventions, address functional mobility deficits, address ROM deficits, address strength deficits, analyze and address soft tissue restrictions, analyze and cue movement patterns, analyze and modify body mechanics/ergonomics, assess and modify postural abnormalities, address imbalance/dizziness and instruct in home and community integration to attain remaining goals. Progress toward goals / Updated goals:    New Goals to be achieved in __4-6__  weeks: 4/23/18  1. Pt will improve FOTO score to >/= 55 to demo a significant improvement in functional activity tolerance.   2. Patient will increase R knee strength in flexion and extension to 4+/5 so patient has improved ability to perform work duties. progressing  3. Patient will demonstrate good eccentric quad control in lateral step down off 6 inch step with no compensation to facilitate normalized gait pattern for stair negotiation. progressing  4. Pt will demonstrate L knee AROM 0 to 120 degrees to promote reciprocal stair negotiation.         PLAN    [x]  Upgrade activities as tolerated YES Continue plan of care   []  Discharge due to :    []  Other:      Therapist: Edwar Rondon DPT, Cert.  DN    Date: 3/23/2018 Time: 7:38 AM     Future Appointments  Date Time Provider Elayne Johnston   3/23/2018 8:30 AM [facility]   3/26/2018 8:30 AM [facility]   3/28/2018 8:30 AM Kasey Corona, [facility]   3/30/2018 8:30 AM [facility]   4/3/2018 9:30 AM [facility]   4/6/2018 8:30 AM [facility]   4/9/2018 8:30 AM [facility]   4/12/2018 11:00 AM Kasey Corona PT [facility]   4/16/2018 8:30 AM Kasey Corona PT [facility]   4/19/2018 8:30 AM Kasey Corona PT [facility]   4/23/2018 8:30 AM [facility]   4/26/2018 11:00 AM Kasey Corona PT [facility]   4/30/2018 8:30 AM Ruchi [facility]

## 2018-03-26 ENCOUNTER — HOSPITAL ENCOUNTER (OUTPATIENT)
Dept: PHYSICAL THERAPY | Age: 58
Discharge: HOME | End: 2018-03-26
Payer: COMMERCIAL

## 2018-03-26 PROCEDURE — 97110 THERAPEUTIC EXERCISES: CPT

## 2018-03-26 PROCEDURE — 97140 MANUAL THERAPY 1/> REGIONS: CPT

## 2018-03-26 PROCEDURE — 97530 THERAPEUTIC ACTIVITIES: CPT

## 2018-03-26 NOTE — PROGRESS NOTES
PHYSICAL THERAPY - DAILY TREATMENT NOTE      Patient Name: Odalys Serna        Date: 3/26/2018  : 1960   YES Patient  Verified  Visit #:     Insurance: Payor: Mercydom Nicole / Plan: [id number] PPO / Product Type: PPO /      In time: 8:17 Out time: 9:15   Total Treatment Time: 58 min       TREATMENT AREA =  Left knee pain [M25.562]    SUBJECTIVE    Pain Level (on 0 to 10 scale):  3  / 10   Medication Changes/New allergies or changes in medical history, any new surgeries or procedures? NO    If yes, update Summary List   Subjective Functional Status/Changes:  []  No changes reported     \"I still can't get good sleep. \"        OBJECTIVE    Modalities Rationale:   decrease edema, decrease inflammation, decrease pain and increase tissue extensibility to improve patient's ability to perform ADLs and self care duties with increased ease   min [] Estim, type/location:                                      []  att     []  unatt     []  w/US     []  w/ice    []  w/heat    min []  Mechanical Traction: type/lbs                   []  pro   []  sup   []  int   []  cont    []  before manual    []  after manual    min []  Ultrasound, settings/location:      min []  Iontophoresis w/ dexamethasone, location:                                               []  take home patch       []  in clinic   10 min [x]  Ice     []  Heat    location/position: Seated heel propped for full ext LLPS    min []  Vasopneumatic Device, press/temp:     min []  Other:    [] Skin assessment post-treatment (if applicable):    [x]  intact    []  redness- no adverse reaction     []redness  adverse reaction:      26 min Therapeutic Exercise:  [x]  See flow sheet   Rationale:      increase ROM, increase strength, improve coordination, improve balance and increase proprioception to improve the patients ability to increase pt's stability/mobility and improve functional activity and ability to perform ADL's    10 min Therapeutic Activity: HK, retro, sidestepping ambulation   Rationale:     decrease pain, increase ROM, increase tissue extensibility and increase postural awareness to improve patient's ability to ambulate with decreased antalgic gait pattern    12 min Manual Therapy: DTM/IASTM with medium cup to right HS. Prone IASTM to HS with active prone TKE   Rationale:      decrease pain, increase ROM, increase tissue extensibility, decrease trigger points and increase postural awareness to improve patient's ability to perform functional activities and decrease pain. 1 min Patient Education:  YES  Reviewed HEP   []  Progressed/Changed HEP based on: Other Objective/Functional Measures:    Knee extension stretch (gastroc + HS stretch) performed long sitting with heel prop under right LE    Significant HS tightness/turgor noted during manual.    Independent with gait activities today without AD     Post Treatment Pain Level (on 0 to 10) scale:   2-3  / 10     ASSESSMENT    Assessment/Changes in Function:     Patient with moderate antalgic gait pattern. Ambulating a.m. Without AD this week. []  See Progress Note/Recertification   Patient will continue to benefit from skilled PT services to modify and progress therapeutic interventions, address functional mobility deficits, address ROM deficits, address strength deficits, analyze and address soft tissue restrictions, analyze and cue movement patterns, analyze and modify body mechanics/ergonomics, assess and modify postural abnormalities, address imbalance/dizziness and instruct in home and community integration to attain remaining goals. Progress toward goals / Updated goals:    New Goals to be achieved in __4-6__  weeks: 4/23/18  1. Pt will improve FOTO score to >/= 55 to demo a significant improvement in functional activity tolerance.   2. Patient will increase R knee strength in flexion and extension to 4+/5 so patient has improved ability to perform work duties. progressing  3. Patient will demonstrate good eccentric quad control in lateral step down off 6 inch step with no compensation to facilitate normalized gait pattern for stair negotiation. progressing  4. Pt will demonstrate L knee AROM 0 to 120 degrees to promote reciprocal stair negotiation.         PLAN    [x]  Upgrade activities as tolerated YES Continue plan of care   []  Discharge due to :    []  Other:      Therapist: Sayda Bishop DPT, Cert.  DN    Date: 3/26/2018 Time: 8:36 AM     Future Appointments  Date Time Provider Elayne Hinojosai   3/28/2018 8:30 AM Kenzie Baptiste PT [facility]   3/30/2018 8:30 AM [facility]   4/3/2018 9:30 AM [facility]   4/6/2018 8:30 AM [facility]   4/9/2018 8:30 AM [facility]   4/12/2018 11:00 AM Kenzie Baptiste PT [facility]   4/16/2018 8:30 AM Kenzie Baptiste PT [facility]   4/19/2018 8:30 AM Kenzie Baptiste PT [facility]   [facility]   4/26/2018 11:00 AM Kenzie Baptiste PT [facility]   4/30/2018 [facility]

## 2018-03-28 ENCOUNTER — HOSPITAL ENCOUNTER (OUTPATIENT)
Dept: PHYSICAL THERAPY | Age: 58
Discharge: HOME | End: 2018-03-28
Payer: COMMERCIAL

## 2018-03-28 PROCEDURE — 97140 MANUAL THERAPY 1/> REGIONS: CPT

## 2018-03-28 PROCEDURE — 97110 THERAPEUTIC EXERCISES: CPT

## 2018-03-28 NOTE — PROGRESS NOTES
PHYSICAL THERAPY - DAILY TREATMENT NOTE    Patient Name: Juanis Munoz        Date: 3/28/2018  : 1960   YES Patient  Verified  Visit #:     Insurance: Payor: Ynes Levels / Plan: Mary TAY [employer]ong [id number] PPO / Product Type: PPO /      In time: 8:30 Out time: 9:20   Total Treatment Time: 50     TREATMENT AREA = Left knee pain [M25.562]    SUBJECTIVE    Pain Level (on 0 to 10 scale):  2  / 10   Medication Changes/New allergies or changes in medical history, any new surgeries or procedures? NO    If yes, update Summary List   Subjective Functional Status/Changes:  []  No changes reported     \"I've gotten busier with the season.  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[place] [address], The knee's doing great\"          OBJECTIVE  Therapeutic Procedures:  Min Procedure Specifics + Rationale   n/a [x]  Patient Education (performed throughout session) [x] Review HEP    [] Progressed/Changed HEP based on:   [] proper performance and advancement of Therex/TA   [] reduction in pain level    [] increased functional capacity       [] change in directional preference   40 [x] Therapeutic Exercise   [x]  See Flowsheet   Rationale: increase ROM and increase strength to improve the patients ability to participate in ADL's    10 []  Manual Therapy [] STM/DTM     [] IASTM     [] Scar Massage  [] X-friction       [] PNF         [] PROM    [] TDN (see objective; actual needle insertion time not billed)   [] Soft tissue mobz   [] Joint mobz: Gr I [] II []  III [] IV[] V[]:  Involved Knee Patellar G2-G4 mobs inferior/medial, STM to quads/HS/ITB interface, Ant/Post G3-G4 Tibofemoral glides, PROM flexion/Ext contract/relax  Rationale: decrease pain, increase ROM, increase tissue extensibility, decrease trigger points and increase postural awareness to attain functional use and participation with ADL's       Other Objective/Functional Measures:    Patient provided with with home ice pack  TC due to patient having to be in [place] by 10:15     Post Treatment Pain Level (on 0 to 10) scale:   0  / 10     ASSESSMENT    Assessment/Changes in Function:     Improved balance in ambulation activity   []  See Progress Note/ Recertification  []  See Discharge Summary        Patient will continue to benefit from skilled PT services to modify and progress therapeutic interventions, address functional mobility deficits, address ROM deficits, address strength deficits, analyze and address soft tissue restrictions, analyze and cue movement patterns, analyze and modify body mechanics/ergonomics and instruct in home and community integration  to attain remaining goals   Progress toward goals / Updated goals:    Excellent progression in level of independence     PLAN    [x]  Upgrade activities as tolerated  [x]  Update interventions per flow sheet YES Continue plan of care   []  Discharge due to :    []  Other:      Therapist: Liliam Childers \"JALEN\" Kin Hallman, DPT, Cert. MDT, Cert. DN, Cert.  SMT    Date: 3/28/2018 Time: 8:47 AM   Future Appointments  Date Time Provider Elayne Johnston   3/30/2018 8:30 AM [facility]   4/3/2018 9:30 AM [facility]   4/6/2018 8:30 AM [facility]   4/9/2018 8:30 AM [facility]   4/12/2018 11:00 AM Henrietta Fernandes [facility]   4/16/2018 8:30 AM Henrietta Fernandes PT [facility]   4/19/2018 8:30 AM Henrietta Fernandes [facility]   4/23/2018 8:30 AM [facility]   4/26/2018 11:00 AM Henrietta Fernandes [facility]   4/30/2018 8:30 AM Christine [facility]

## 2018-03-30 ENCOUNTER — HOSPITAL ENCOUNTER (OUTPATIENT)
Dept: PHYSICAL THERAPY | Age: 58
Discharge: HOME | End: 2018-03-30
Payer: COMMERCIAL

## 2018-03-30 PROCEDURE — 97110 THERAPEUTIC EXERCISES: CPT

## 2018-03-30 PROCEDURE — 97140 MANUAL THERAPY 1/> REGIONS: CPT

## 2018-03-30 NOTE — PROGRESS NOTES
PHYSICAL THERAPY - DAILY TREATMENT NOTE      Patient Name: Tadeo Quiroz        Date: 3/30/2018  : 1960   YES Patient  Verified  Visit #:     Insurance: Payor: Keri Thurston / Plan: Mary Guerin [id number] PPO / Product Type: PPO /       In time: 8:35 Out time: 9:35   Total Treatment Time: 60 min       TREATMENT AREA =  Left knee pain [M25.562]    SUBJECTIVE    Pain Level (on 0 to 10 scale):  5  / 10   Medication Changes/New allergies or changes in medical history, any new surgeries or procedures? NO    If yes, update Summary List   Subjective Functional Status/Changes:  []  No changes reported     \" \"I had a terrible night sleeping last night. 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 \"        OBJECTIVE    Modalities Rationale:   decrease edema, decrease inflammation, decrease pain and increase tissue extensibility to improve patient's ability to perform ADLs and self care duties with increased ease   min [] Estim, type/location:                                      []  att     []  unatt     []  w/US     []  w/ice    []  w/heat    min []  Mechanical Traction: type/lbs                   []  pro   []  sup   []  int   []  cont    []  before manual    []  after manual    min []  Ultrasound, settings/location:      min []  Iontophoresis w/ dexamethasone, location:                                               []  take home patch       []  in clinic   10 min [x]  Ice     []  Heat    location/position: Supine to Right knee, heel propped for full ext LLPS    min []  Vasopneumatic Device, press/temp:     min []  Other:    [] Skin assessment post-treatment (if applicable):    [x]  intact    []  redness- no adverse reaction     []redness  adverse reaction:       40 min Therapeutic Exercise:  [x]  See flow sheet   Rationale:      increase ROM, increase strength, improve coordination, improve balance and increase proprioception to improve the patients ability to increase pt's stability/mobility and improve functional activity and ability to perform ADL's    10 min Manual Therapy: Grade 4 knee extension mobs, PROM/AAROM knee flexion with PT overpressure (heel slide)   Rationale:      decrease pain, increase ROM, increase tissue extensibility and increase postural awareness to improve patient's ability to perform functional activities and decrease pain. min Patient Education:  YES  Reviewed HEP   []  Progressed/Changed HEP based on: Other Objective/Functional Measures:    Decr pain and improved gait mechanics noted following manual interventions     Post Treatment Pain Level (on 0 to 10) scale:   2  / 10     ASSESSMENT    Assessment/Changes in Function:     Increased knee pain today secondary to activity level yesterday. Focused on ROM interventions today. []  See Progress Note/Recertification   Patient will continue to benefit from skilled PT services to modify and progress therapeutic interventions, address functional mobility deficits, address ROM deficits, address strength deficits, analyze and address soft tissue restrictions, analyze and cue movement patterns, analyze and modify body mechanics/ergonomics, assess and modify postural abnormalities, address imbalance/dizziness and instruct in home and community integration to attain remaining goals. Progress toward goals / Updated goals:    Minimal progress towards goals d/t increased pain levels today     PLAN    [x]  Upgrade activities as tolerated YES Continue plan of care   []  Discharge due to :    []  Other:      Therapist: Noemi Guerrero DPT, Cert.  DN    Date: 3/30/2018 Time: 7:59 AM     Future Appointments  Date Time Provider Elayne Johnston   3/30/2018 8:30 AM [facility]   4/3/2018 9:30 AM [facility]   4/6/2018 8:30 AM [facility]   4/9/2018 8:30 AM [facility]   4/12/2018 11:00 AM Tiffanie Gee PT [facility]   4/16/2018 8:30 AM Eduardo Gonzalez PT [facility]   4/19/2018 8:30 AM Eduardo Gonzalez PT [facility]   4/23/2018 8:30 AM Lisa [facility]   4/26/2018 11:00 AM Eduardo Gonzalez PT [facility]   4/30/2018 8:30 AM [facility]

## 2018-04-03 ENCOUNTER — HOSPITAL ENCOUNTER (OUTPATIENT)
Dept: PHYSICAL THERAPY | Age: 58
Discharge: HOME | End: 2018-04-03
Payer: COMMERCIAL

## 2018-04-03 PROCEDURE — 97110 THERAPEUTIC EXERCISES: CPT

## 2018-04-03 PROCEDURE — 97530 THERAPEUTIC ACTIVITIES: CPT

## 2018-04-03 NOTE — PROGRESS NOTES
PHYSICAL THERAPY - DAILY TREATMENT NOTE      Patient Name: Juanis Munoz        Date: 4/3/2018  : 1960   YES Patient  Verified  Visit #:     Insurance: Payor: Ynes Levels / Plan: Lawsonintmadonna Guerin [id number] PPO / Product Type: PPO /      In time: 9:00 Out time: 10:05   Total Treatment Time: 65 min       TREATMENT AREA =  Left knee pain [M25.562]    SUBJECTIVE    Pain Level (on 0 to 10 scale):  2  / 10   Medication Changes/New allergies or changes in medical history, any new surgeries or procedures? NO    If yes, update Summary List   Subjective Functional Status/Changes:  []  No changes reported     \"I was given a boot. I'm really pleased with it. \"        OBJECTIVE    Modalities Rationale:   decrease edema, decrease inflammation, decrease pain and increase tissue extensibility to improve patient's ability to perform ADLs and self care duties with increased ease   min [] Estim, type/location:                                      []  att     []  unatt     []  w/US     []  w/ice    []  w/heat    min []  Mechanical Traction: type/lbs                   []  pro   []  sup   []  int   []  cont    []  before manual    []  after manual    min []  Ultrasound, settings/location:      min []  Iontophoresis w/ dexamethasone, location:                                               []  take home patch       []  in clinic   10 min [x]  Ice     []  Heat    location/position: Seated in chair with left LE propped on plinth in knee extension    min []  Vasopneumatic Device, press/temp:     min []  Other:    [] Skin assessment post-treatment (if applicable):    [x]  intact    []  redness- no adverse reaction     []redness  adverse reaction:      47 min Therapeutic Exercise:  [x]  See flow sheet   Rationale:      increase ROM, increase strength, improve coordination, improve balance and increase proprioception to improve the patients ability to increase pt's stability/mobility and improve functional activity and ability to perform ADL's    8 min Therapeutic Activity: HK marching with SLS hold, sidestepping with 5# KB   Rationale:     decrease pain, increase ROM, increase tissue extensibility and increase postural awareness to improve patient's ability to ambulate with improved LE stability    1 min Patient Education:  YES  Reviewed HEP   []  Progressed/Changed HEP based on: Other Objective/Functional Measures: Added 5# KB hold to walking marches and sidestepping  Min VC required for proper therex performance  Progressed reps/sets of LE therex per flowsheeet     Post Treatment Pain Level (on 0 to 10) scale:   0  / 10     ASSESSMENT    Assessment/Changes in Function:     Progressing slowly towards ROM goals. []  See Progress Note/Recertification   Patient will continue to benefit from skilled PT services to modify and progress therapeutic interventions, address functional mobility deficits, address ROM deficits, address strength deficits, analyze and address soft tissue restrictions, analyze and cue movement patterns, analyze and modify body mechanics/ergonomics, assess and modify postural abnormalities, address imbalance/dizziness and instruct in home and community integration to attain remaining goals. Progress toward goals / Updated goals:    New Goals to be achieved in __4-6__  weeks:  1. Pt will improve FOTO score to >/= 55 to demo a significant improvement in functional activity tolerance. 2. Patient will increase R knee strength in flexion and extension to 4+/5 so patient has improved ability to perform work duties. See flowsheet  3. Patient will demonstrate good eccentric quad control in lateral step down off 6 inch step with no compensation to facilitate normalized gait pattern for stair negotiation. NV  4. Pt will demonstrate L knee AROM 0 to 120 degrees to promote reciprocal stair negotiation.         PLAN    [x]  Upgrade activities as tolerated YES Continue plan of care   [] Discharge due to :    []  Other:      Therapist: Tressa Adair DPT, Cert.  DN    Date: 4/3/2018 Time: 8:59 AM     Future Appointments  Date Time Provider Elayne Johnston   4/3/2018 9:30 AM [facility]   4/6/2018 8:30 AM Jessica HARPER [facility]   4/9/2018 8:30 AM Julio César [facility]   4/12/2018 11:00 AM Shanda Rodriguez PT [facility]   4/16/2018 8:30 AM Shanda Rodriguez PT [facility]   4/19/2018 8:30 AM Shanda Rodriguez PT [facility]   4/23/2018 8:30 AM Julio César [facility]   4/26/2018 11:00 AM Shanda Rodriguez PT [facility]   4/30/2018 8:30 AM Julio César [facility]

## 2018-04-05 NOTE — PROGRESS NOTES
PHYSICAL THERAPY - DAILY TREATMENT NOTE      Patient Name: King Charu        Date: 2018  : 1960   YES Patient  Verified  Visit #:     Insurance: Payor: Ana Marcus / Plan: [facility] PPO / Product Type: PPO /      In time: 8:13 Out time: 9:13   Total Treatment Time: 60 min       TREATMENT AREA =  Left knee pain [M25.562]    SUBJECTIVE    Pain Level (on 0 to 10 scale):  3  / 10   Medication Changes/New allergies or changes in medical history, any new surgeries or procedures? NO    If yes, update Summary List   Subjective Functional Status/Changes:  []  No changes reported     \"Its sore. Really sore. \"        OBJECTIVE    Modalities Rationale:   decrease edema, decrease inflammation and decrease pain to improve patient's ability to perform ADLs and self care duties with increased ease   min [] Estim, type/location:                                      []  att     []  unatt     []  w/US     []  w/ice    []  w/heat    min []  Mechanical Traction: type/lbs                   []  pro   []  sup   []  int   []  cont    []  before manual    []  after manual    min []  Ultrasound, settings/location:      min []  Iontophoresis w/ dexamethasone, location:                                               []  take home patch       []  in clinic   10 min [x]  Ice     []  Heat    location/position: Supine to left knee, heel propped for full ext LLPS on red wedge    min []  Vasopneumatic Device, press/temp:     min []  Other:    [] Skin assessment post-treatment (if applicable):    [x]  intact    []  redness- no adverse reaction     []redness  adverse reaction:      40 min Therapeutic Exercise:  [x]  See flow sheet   Rationale:      increase ROM, increase strength, improve coordination, improve balance and increase proprioception to improve the patients ability to increase pt's stability/mobility and improve functional activity and ability to perform ADL's    10 min Therapeutic Activity: HK marching with SLS hold, sidestepping with 5# KB, TG squats   Rationale:     increase ROM, increase tissue extensibility and increase postural awareness to improve patient's ability to improve stance stability and dec antalgic gait pattern    1 min Patient Education:  YES  Reviewed HEP   []  Progressed/Changed HEP based on: Other Objective/Functional Measures: Added 5# KB hold to walking marches and sidestepping  Min VC required for proper therex performance  Progressed reps/sets of LE therex per flowsheeet     Post Treatment Pain Level (on 0 to 10) scale:   1-2  / 10     ASSESSMENT    Assessment/Changes in Function:     Patient progressing slowly towards ROM goals     []  See Progress Note/Recertification   Patient will continue to benefit from skilled PT services to modify and progress therapeutic interventions, address functional mobility deficits, address ROM deficits, address strength deficits, analyze and address soft tissue restrictions, analyze and cue movement patterns, analyze and modify body mechanics/ergonomics, assess and modify postural abnormalities, address imbalance/dizziness and instruct in home and community integration to attain remaining goals. Progress toward goals / Updated goals:    New Goals to be achieved in __4-6__  weeks:  1. Pt will improve FOTO score to >/= 55 to demo a significant improvement in functional activity tolerance. 2. Patient will increase R knee strength in flexion and extension to 4+/5 so patient has improved ability to perform work duties. 3. Patient will demonstrate good eccentric quad control in lateral step down off 6 inch step with no compensation to facilitate normalized gait pattern for stair negotiation. 4. Pt will demonstrate L knee AROM 0 to 120 degrees to promote reciprocal stair negotiation.          PLAN    [x]  Upgrade activities as tolerated YES Continue plan of care   []  Discharge due to :    []  Other:      Therapist: Ca Wilson Nawaf, JANAE, Cert.  DN    Date: 4/5/2018 Time: 7:31 PM     Future Appointments  Date Time Provider Elayne Vickie   4/6/2018 8:30 AM Anastasia Espinal [facility]   4/10/2018 10:30 AM Anastasia [facility]   4/12/2018 11:00 AM Jodeane Apley, PT [facility]   4/16/2018 8:30 AM Jodeane Apley, PT [facility]   4/19/2018 8:30 AM Jodeane Apley, PT [facility]   4/23/2018 8:30 AM Anastasia [facility]   4/26/2018 11:00 AM Jodeane Apley, PT [facility]   4/30/2018 8:30 AM [facility]

## 2018-04-06 ENCOUNTER — HOSPITAL ENCOUNTER (OUTPATIENT)
Dept: PHYSICAL THERAPY | Age: 58
Discharge: HOME | End: 2018-04-06
Payer: COMMERCIAL

## 2018-04-06 PROCEDURE — 97530 THERAPEUTIC ACTIVITIES: CPT

## 2018-04-06 PROCEDURE — 97110 THERAPEUTIC EXERCISES: CPT

## 2018-04-09 ENCOUNTER — APPOINTMENT (OUTPATIENT)
Dept: PHYSICAL THERAPY | Age: 58
End: 2018-04-09
Payer: COMMERCIAL

## 2018-04-10 ENCOUNTER — HOSPITAL ENCOUNTER (OUTPATIENT)
Dept: PHYSICAL THERAPY | Age: 58
Discharge: HOME | End: 2018-04-10
Payer: COMMERCIAL

## 2018-04-10 PROCEDURE — 97140 MANUAL THERAPY 1/> REGIONS: CPT

## 2018-04-10 NOTE — PROGRESS NOTES
PHYSICAL THERAPY - DAILY TREATMENT NOTE    Patient Name: Leo Alex        Date: 4/10/2018  : 1960   yes Patient  Verified  Visit #:     Insurance: Payor: Vallejo Luanne / Plan: Mary Guerin [id number] PPO / Product Type: PPO /      In time: 908 Out time: 941   Total Treatment Time: 33     TREATMENT AREA =  Left knee pain [M25.562]    SUBJECTIVE  Pain Level (on 0 to 10 scale):  9-10  / 10   Medication Changes/New allergies or changes in medical history, any new surgeries or procedures?    no  If yes, update Summary List   Subjective Functional Status/Changes:  []  No changes reported     \"I was on my feet all weekend. 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 I should have\"       OBJECTIVE  Modalities Rationale:     decrease inflammation and decrease pain to improve patient's ability to  perform ADLs/prolong stding and amb/stairs with ease    min [] Estim, type/location:                                      []  att     []  unatt     []  w/US     []  w/ice    []  w/heat    min []  Mechanical Traction: type/lbs                   []  pro   []  sup   []  int   []  cont    []  before manual    []  after manual    min []  Ultrasound, settings/location:      min []  Iontophoresis w/ dexamethasone, location:                                               []  take home patch       []  in clinic   10 min [x]  Ice     []  Heat    location/position: supine with elevation with wedge under ankle for ext    min []  Vasopneumatic Device, press/temp:     min []  Other:    [x] Skin assessment post-treatment (if applicable):    [x]  intact    []  redness- no adverse reaction     []redness  adverse reaction:          23 min Manual Therapy: KT for lymphatic system to med/lat knee at 15%* see in chart  The patient received manual stimulation techniques to increase lymphatic circulation and improve tissue health. Techniques were provided to decrease edema in Right knee.  Manual lymphatic techniques were provided for 13 minutes in a [id number] pattern. Patient was without any contraindications to the techniques. He was with verbalized understanding of all techniques, reason for such, and performance of AROM thereafter. Rationale:      decrease pain, increase ROM, increase tissue extensibility, decrease trigger points and increase postural awareness to improve patient's ability to  perform ADLs/prolong stding and amb/stairs with ease      min Patient Education:  yes  Reviewed HEP   []  Progressed/Changed HEP based on: Pt ed on importance and benefits of compliance with HEP, core strength/stability and proper posture; pt verbalized understanding    Instructed pt to apply ice >/=2x daily to manage pain/inflammation        Other Objective/Functional Measures:  FOTO=55  see manual  demos significant antalgic gait upon entering clinic  held TE due to managing swelling through manual tech     Post Treatment Pain Level (on 0 to 10) scale:   9  / 10     ASSESSMENT  Assessment/Changes in Function:     limited by pain and swelling due to increased activity      []  See Progress Note/Recertification   Patient will continue to benefit from skilled PT services to modify and progress therapeutic interventions, address functional mobility deficits, address ROM deficits, address strength deficits, analyze and address soft tissue restrictions, analyze and cue movement patterns, analyze and modify body mechanics/ergonomics, assess and modify postural abnormalities and instruct in home and community integration to attain remaining goals. Progress toward goals / Updated goals:    New Goals to be achieved in __4-6__  weeks:  1. Pt will improve FOTO score to >/= 55 to demo a significant improvement in functional activity tolerance. MET-55  2. Patient will increase R knee strength in flexion and extension to 4+/5 so patient has improved ability to perform work duties.   3. Patient will demonstrate good eccentric quad control in lateral step down off 6 inch step with no compensation to facilitate normalized gait pattern for stair negotiation.   4. Pt will demonstrate L knee AROM 0 to 120 degrees to promote reciprocal stair negotiation.       PLAN  [x]  Upgrade activities as tolerated yes Continue plan of care   []  Discharge due to :    []  Other:      Therapist: Dale Machuca PTA    Date: 4/10/2018 Time: 12:50 PM     Future Appointments  Date Time Provider Elayne Johnston   4/12/2018 11:00 AM Rosanna Saldaña PT [facility]   4/16/2018 8:30 AM Rosanna Saldaña PT [facility]   4/19/2018 8:30 AM Rosanna Saldaña PT [facility]   4/23/2018 8:30 AM Lalo [facility]   4/26/2018 11:00 AM Rosanna Saldaña PT [facility]   4/30/2018 8:30 AM Lalo [facility]

## 2018-04-12 ENCOUNTER — HOSPITAL ENCOUNTER (OUTPATIENT)
Dept: PHYSICAL THERAPY | Age: 58
Discharge: HOME | End: 2018-04-12
Payer: COMMERCIAL

## 2018-04-12 PROCEDURE — 97014 ELECTRIC STIMULATION THERAPY: CPT

## 2018-04-12 PROCEDURE — 97110 THERAPEUTIC EXERCISES: CPT

## 2018-04-12 PROCEDURE — 97140 MANUAL THERAPY 1/> REGIONS: CPT

## 2018-04-12 NOTE — PROGRESS NOTES
PHYSICAL THERAPY - DAILY TREATMENT NOTE    Patient Name: Zach Pederson        Date: 2018  : 1960   YES Patient  Verified  Visit #:     Insurance: Payor: Loral Crown / Plan: [facility] PPO / Product Type: PPO /      In time: 10:55 Out time: 11:40   Total Treatment Time: 45     TREATMENT AREA = Left knee pain [M25.562]    SUBJECTIVE    Pain Level (on 0 to 10 scale):  5  / 10   Medication Changes/New allergies or changes in medical history, any new surgeries or procedures? NO    If yes, update Summary List   Subjective Functional Status/Changes:  []  No changes reported     \"Nowhere near as bad as Monday. I think the taping helped b/c the swelling's down a lot.  \"          OBJECTIVE  Therapeutic Procedures:  Min Procedure Specifics + Rationale   n/a [x]  Patient Education (performed throughout session) [x] Review HEP    [] Progressed/Changed HEP based on:   [] proper performance and advancement of Therex/TA   [] reduction in pain level    [] increased functional capacity       [] change in directional preference   10 [x] Therapeutic Exercise   [x]  See Flowsheet   Rationale: increase ROM and increase strength to improve the patients ability to participate in ADL's    25 []  Manual Therapy [] STM/DTM     [] IASTM     [] Scar Massage  [] X-friction       [] PNF         [] PROM    [] TDN (see objective; actual needle insertion time not billed)   [] Soft tissue mobz   [] Joint mobz: Gr I [] II []  III [] IV[] V[]:  Involved Knee Patellar G2-G4 mobs inferior/medial, STM to quads/HS/ITB interface, Ant/Post G3-G4 Tibofemoral glides, PROM flexion/Ext contract/relax  Effleurage    Rationale: decrease pain, increase ROM, increase tissue extensibility, decrease trigger points and increase postural awareness to attain functional use and participation with ADL's     Modality rationale: decrease inflammation, decrease pain, increase tissue extensibility and increase muscle contraction/control to improve the patients ability to perform ADL's with greater ease   Min Type Additional Details   15 [x] E-Stim:       [] Att                 [x] Unatt                                [] Premod         [x] IFC                          [] NMES           [] TENS instruct                          [] EDN: Location: Left Knee  [x] supine              [] prone  [x] legs elevatedv   [] legs flat  []w/heat  [x]w/ice  []w/US    [] Skin assessment post-treatment:  []intact []redness- no adverse reaction       []redness  adverse reaction:     Other Objective/Functional Measures:    See PN     Post Treatment Pain Level (on 0 to 10) scale:   1-2  / 10     ASSESSMENT    Assessment/Changes in Function:      [x]  See Progress Note/ Recertification  []  See Discharge Summary        Patient will continue to benefit from skilled PT services to modify and progress therapeutic interventions, address functional mobility deficits, address ROM deficits, address strength deficits, analyze and address soft tissue restrictions, analyze and cue movement patterns, analyze and modify body mechanics/ergonomics and instruct in home and community integration  to attain remaining goals   Progress toward goals / Updated goals:    See PN     PLAN    [x]  Upgrade activities as tolerated  [x]  Update interventions per flow sheet YES Continue plan of care   []  Discharge due to :    []  Other:      Therapist: Beverli Bamberger \"BJ\" Naila Reynolds DPT, Cert. MDT, Cert. DN, Cert.  SMT    Date: 4/12/2018 Time: 11:00 AM   Future Appointments  Date Time Provider Elayne Johnston   4/16/2018 8:30 AM Lisbeth Curtis [facility]   4/19/2018 8:30 AM Lisbeth Curtis PT [facility]   4/23/2018 8:30 AM [facility]   4/26/2018 11:00 AM Lisbeth Curtis [facility]   4/30/2018 8:30 AM [facility]

## 2018-04-12 NOTE — PROGRESS NOTES
Jamari Gonzalez 29  [facility] PHYSICAL THERAPY  [address] [facility] [address], [address] - Phone: [phone]  Fax: [phone]  PROGRESS NOTE  Patient Name: Neomia Bence : 1960   Treatment/Medical Diagnosis: Left knee pain [M25.562]   Referral Source: Mariano Garza MD     Date of Initial Visit: 18 Attended Visits: 22 Missed Visits: 1       CURRENT STATUS  Patient had previously well, having an average of 2/10 pain at worst until he started to work normal work duties in March. He was able to perform Left Knee AROM 0-90 degrees at last PN, but now presents with  degrees. PROM =  degrees. 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 He had worked this past Saturday from 7am-3am . He continued to work the next day as well, and had returned to PT on Monday 4/10/18 with 9/10 pain. We were able to utilize manual techniques to alleviate swelling and improve AROM to  degrees, PROM  degrees. Goal/Measure of Progress Goal Met? 1. Pt will improve FOTO score to >/= 55 to demo a significant improvement in functional activity tolerance. 2. Patient will increase R knee strength in flexion and extension to 4+/5 so patient has improved ability to perform work duties. 3. Patient will demonstrate good eccentric quad control in lateral step down off 6 inch step with no compensation to facilitate normalized gait pattern for stair negotiation. 4. Pt will demonstrate L knee AROM 0 to 120 degrees to promote reciprocal stair negotiation. NO      NO      NO        NO     G-Codes: n/a  RECOMMENDATIONS  Patient reaching limitation in insurance approved visits for the rest of the year. Due to level of familiarity with land based exercise and swelling, we recommend trial of aquatherapy for low load therapy to address swelling and pain.  We also recommend an unloading brace in order to further alleviate stress as he returns to work and a home estim unit to alleviate pain. Recommend to continue 2x/week of land and aquatic based therapy to transition to independent based HEP and address goals above. We also discussed with patient attaining a commercial pool membership to facilitate HEP for aquatics. If you have any questions/comments please contact us directly at [phone]. Thank you for allowing us to assist in the care of your patient. Therapist Signature: Nathaniel Winter \"BJ\" Kanchan Gaytan, JANAE, Cert. MDT, Cert. DN, Cert. SMT Date: 6/80/8250   Certification Period: n/a     Reporting Period n/a   Time: 11:01 AM   NOTE TO PHYSICIAN:  PLEASE COMPLETE THE ORDERS BELOW AND FAX TO   [facility] Physical Therapy: [phone]. If you are unable to process this request in 24 hours please contact our office: [phone].    ___ I have read the above report and request that my patient continue as recommended.   ___ I have read the above report and request that my patient continue therapy with the following changes/special instructions:_________________________________________________________   ___ I have read the above report and request that my patient be discharged from therapy.      Physician Signature:        Date:       Time:

## 2018-04-16 ENCOUNTER — HOSPITAL ENCOUNTER (OUTPATIENT)
Dept: PHYSICAL THERAPY | Age: 58
Discharge: HOME | End: 2018-04-16
Payer: COMMERCIAL

## 2018-04-16 PROCEDURE — 97113 AQUATIC THERAPY/EXERCISES: CPT

## 2018-04-16 NOTE — PROGRESS NOTES
PHYSICAL THERAPY - DAILY TREATMENT NOTE - AQUATICS    Patient Name: Tadeo Quiroz        Date: 2018  : 1960   YES Patient  Verified  Visit #:     Insurance: Payor: BLUE CROSS / Plan: [facility] PPO / Product Type: PPO /      In time: 1:50early Out time: 1:45   Total Treatment Time: 55     Medicare Time Tracking (below)   Total Timed Codes (min):  n/a 1:1 Treatment Time:  n/a     TREATMENT AREA =  Left knee pain [M25.562]    SUBJECTIVE    Pain Level (on 0 to 10 scale):  4  / 10   Medication Changes/New allergies or changes in medical history, any new surgeries or procedures? 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 Busy weekend. But knee is a lot better. I joined the [employer] here. OBJECTIVE  55 min Therapeutic Exercise:   [x]  Aquatic Therapy   Rationale:      increase ROM, increase strength, improve coordination, and improve balance to improve the patients ability to perform ADL's and increase level of independence.      [x]   Patient Education:  Continue Aquatic Therapy and HEP as instructed     UE exercise resistance:                                                  LE exercises:                                                                 [] none                                                                             []  thera-band resistance       [] small water weights                                                   [] no UE support       [x] medium water weights                                              [x]  1 UE support        [] large water weights                                                   []  2 UE support          [] back supported on wall       [] thera-band      SLS UE support:                      [x] both UE [] 1 UE       [] 1 finger/fingers       []  none       [] EC     Post Treatment Pain Level (on 0 to 10) scale:   1  / 10     Other  patient performed walking teep kicks, careoca, sidestep shuffle, zig-zags for ambulation at the beginning of class  Educated patient on DOMS         ASSESSMENT    Assessment/Changes in Function:     Tolerated therex as he reported relief in symptoms while in water     []  See Progress Note/Recertification   Patient will continue to benefit from skilled PT services to modify and progress therapeutic interventions, address functional mobility deficits, address ROM deficits, address strength deficits, analyze and address soft tissue restrictions, analyze and cue movement patterns, analyze and modify body mechanics/ergonomics and instruct in home and community integration to attain remaining goals. to attain remaining goals. Progress toward goals / Updated goals:    First water therapy session, awaiting response towards rx. PLAN    []  Upgrade activities as tolerated YES Continue plan of care   []  Discharge due to :    []  Other:      Therapist: Kelby Sever \"BJ\" JANAE Posey, Cert. MDT, Cert.  DN    Date: 4/16/2018 Time: 2:41 PM   Future Appointments  Date Time Provider Elayne Johnston   4/19/2018 2:00 PM Cristina Mullen PT [facility]   [facility]   4/23/2018 8:30 AM [facility]   4/26/2018 11:00 AM Cristina Mullen PT [facility]   4/30/2018 8:30 AM [facility]

## 2018-04-19 ENCOUNTER — APPOINTMENT (OUTPATIENT)
Dept: PHYSICAL THERAPY | Age: 58
End: 2018-04-19
Payer: COMMERCIAL

## 2018-04-19 ENCOUNTER — HOSPITAL ENCOUNTER (OUTPATIENT)
Dept: PHYSICAL THERAPY | Age: 58
Discharge: HOME | End: 2018-04-19
Payer: COMMERCIAL

## 2018-04-19 PROCEDURE — 97113 AQUATIC THERAPY/EXERCISES: CPT

## 2018-04-19 NOTE — PROGRESS NOTES
PHYSICAL THERAPY - DAILY TREATMENT NOTE - AQUATICS    Patient Name: Colleen Loya        Date: 2018  : 1960   YES Patient  Verified  Visit #:     Insurance: Payor: BLUE CROSS / Plan: [id number] PPO / Product Type: PPO /      In time: 1:55 Out time: 2:50   Total Treatment Time: 55     Medicare Time Tracking (below)   Total Timed Codes (min):  n/a 1:1 Treatment Time:  n/a     TREATMENT AREA =  Left knee pain [M25.562]    SUBJECTIVE    Pain Level (on 0 to 10 scale):  2-3   10   Medication Changes/New allergies or changes in medical history, any new surgeries or procedures? NO    If yes, update Summary List   Subjective Functional Status/Changes:  []  No changes reported     \"I'm a lot better. I saw Dr. Felipe Vale yesterday and he saw your note. He went ahead and gave me a brace. He said that I'd been pushing myself too hard at work. But everything with my blood work and x-rays were fine. The PA also suggested I work more on my hip strength, too. They're really happy with my progress and that I was able to get into the pool. \"         OBJECTIVE  55 min Therapeutic Exercise:   [x]  Aquatic Therapy   Rationale:      increase ROM, increase strength, improve coordination, and improve balance to improve the patients ability to perform ADL's and increase level of independence.      [x]   Patient Education:  Continue Aquatic Therapy and HEP as instructed     UE exercise resistance:                                                  LE exercises:                                                                 [] none                                                                             []  thera-band resistance       [] small water weights                                                   [] no UE support       [] medium water weights                                              []  1 UE support        [] large water weights                                                   [] 2 UE support          [] back supported on wall       [] thera-band      SLS UE support:                      [] both UE                          [] 1 UE       [] 1 finger/fingers       []  none       [] EC     Post Treatment Pain Level (on 0 to 10) scale:   2  / 10     Other Focused rx on variations of ambulation activity transferred from land to water: Marching, Sidestepping, front Kicks, Hip Circles, Forest 2, Open Book, grapevine, zig zag     ASSESSMENT    Assessment/Changes in Function:     Good balance noted and able to self correct in water. []  See Progress Note/Recertification   Patient will continue to benefit from skilled PT services to modify and progress therapeutic interventions, address functional mobility deficits, address ROM deficits, address strength deficits, analyze and address soft tissue restrictions, analyze and cue movement patterns, analyze and modify body mechanics/ergonomics and instruct in home and community integration to attain remaining goals. to attain remaining goals. Progress toward goals / Updated goals:    excellent     PLAN    []  Upgrade activities as tolerated YES Continue plan of care   []  Discharge due to :    []  Other: Resume with normal aquatherapy exercises NV, then return to land based rx. Therapist: Petar Campos \"BJ\" Av Bridges, DPT, Cert. MDT, Cert.  DN    Date: 4/19/2018 Time: 3:14 PM   Future Appointments  Date Time Provider Elayne Johnston   4/20/2018 10:00 AM [address] [place] [address]   4/23/2018 8:30 AM Ranae Landau [facility]   4/26/2018 11:00 AM Chito Encinas PT [facility]   4/30/2018 8:30 AM Ranae Landau [facility]

## 2018-04-20 ENCOUNTER — HOSPITAL ENCOUNTER (OUTPATIENT)
Dept: PHYSICAL THERAPY | Age: 58
Discharge: HOME | End: 2018-04-20
Payer: COMMERCIAL

## 2018-04-20 PROCEDURE — 97113 AQUATIC THERAPY/EXERCISES: CPT

## 2018-04-20 NOTE — PROGRESS NOTES
PHYSICAL THERAPY - DAILY TREATMENT NOTE - AQUATICS    Patient Name: Noam Damon        Date: 2018  : 1960   YES Patient  Verified  Visit #:     Insurance: Payor: Narinder Henderson / Plan: [facility] PPO / Product Type: PPO /      In time: 10:05 Out time: 10:49   Total Treatment Time: 44     Medicare Time Tracking (below)   Total Timed Codes (min):  na 1:1 Treatment Time:  na     TREATMENT AREA =  Left knee pain [M25.562]    SUBJECTIVE    Pain Level (on 0 to 10 scale):  2-3  / 10   Medication Changes/New allergies or changes in medical history, any new surgeries or procedures? NO    If yes, update Summary List   Subjective Functional Status/Changes:  []  No changes reported     \"I'm doing better. \"  \"I think the brace is helping. \"    Pt states that he likes the mobility his hips get with the water exercises. OBJECTIVE  44 min Therapeutic Exercise:   [x]  Aquatic Therapy   Rationale:      increase strength, increase ROM, and improve balance to improve the patients ability to perform ADL's and ambulate with less pain and increase activity tolerance.       [x]   Patient Education:  Continue Aquatic Therapy and HEP as instructed     UE exercise resistance:                                                  LE exercises:                                                                 [] none                                                                             []  thera-band resistance       [] small water weights                                                   [x] no UE support       [x] medium water weights                                              [x]  1 UE support        [] large water weights                                                   []  2 UE support          [] back supported on wall       [] thera-band      SLS UE support:                      [] both UE                          [] 1 UE       [] 1 finger/fingers       [x]  none       [] EC Post Treatment Pain Level (on 0 to 10) scale:   2-3  / 10     Other No c/o pain with ther-ex. Pt performed gastroc and HS stretch at wall for increased LE ROM/flexibility. Pt performed heel to toe, retro, side step and karaoke walks with amb in water. ASSESSMENT    Assessment/Changes in Function:     Pt required minimal UE support for balance with aquatic ex. []  See Progress Note/Recertification   Patient will continue to benefit from skilled PT services to modify and progress therapeutic interventions, address functional mobility deficits, address ROM deficits, address strength deficits, analyze and address soft tissue restrictions, analyze and cue movement patterns, analyze and modify body mechanics/ergonomics and instruct in home and community integration to attain remaining goals. .     Progress toward goals / Updated goals:    Pt required min VCing for form with aquatic ex.       PLAN    []  Upgrade activities as tolerated YES Continue plan of care   []  Discharge due to :    []  Other:      Therapist: Unique Berg PTA    Date: 4/20/2018 Time: 10:57 AM     Future Appointments  Date Time Provider lEayne Johnston   4/23/2018 8:30 AM [facility]   4/26/2018 11:00 AM Cristina Mullen, [facility]   4/30/2018 8:30 AM [facility]

## 2018-04-23 ENCOUNTER — APPOINTMENT (OUTPATIENT)
Dept: PHYSICAL THERAPY | Age: 58
End: 2018-04-23
Payer: COMMERCIAL

## 2018-04-23 ENCOUNTER — HOSPITAL ENCOUNTER (OUTPATIENT)
Dept: PHYSICAL THERAPY | Age: 58
Discharge: HOME | End: 2018-04-23
Payer: COMMERCIAL

## 2018-04-23 PROCEDURE — 97113 AQUATIC THERAPY/EXERCISES: CPT

## 2018-04-23 PROCEDURE — 97110 THERAPEUTIC EXERCISES: CPT

## 2018-04-23 NOTE — PROGRESS NOTES
PHYSICAL THERAPY - DAILY TREATMENT NOTE - AQUATICS    Patient Name: Tessa Brought        Date: 2018  : 1960   YES Patient  Verified  Visit #:   32   of   30  Insurance: Payor: BLUE CROSS / Plan: [id number] PPO / Product Type: PPO /      In time: 12:50 Out time: 1;50   Total Treatment Time: 60     Medicare Time Tracking (below)   Total Timed Codes (min):  60 1:1 Treatment Time:  60     TREATMENT AREA =  Left knee pain [M25.562]    SUBJECTIVE    Pain Level (on 0 to 10 scale):    / 10   Medication Changes/New allergies or changes in medical history, any new surgeries or procedures? NO    If yes, update Summary List   Subjective Functional Status/Changes:  []  No changes reported     \"I'm much better than before. I had an easy relaxing weekend. \"         OBJECTIVE  60 min Therapeutic Exercise:   [x]  Aquatic Therapy   Rationale:      increase ROM, increase strength, improve coordination, and improve balance to improve the patients ability to perform ADL's and increase level of independence.      [x]   Patient Education:  Continue Aquatic Therapy and HEP as instructed     UE exercise resistance:                                                  LE exercises:                                                                 [] none                                                                             []  thera-band resistance       [] small water weights                                                   [] no UE support       [] medium water weights                                              []  1 UE support        [] large water weights                                                   []  2 UE support          [] back supported on wall       [] thera-band      SLS UE support:                      [] both UE                          [] 1 UE       [] 1 finger/fingers       []  none       [] EC     Post Treatment Pain Level (on 0 to 10) scale:   0  / 10     Other  Discussed Finishing remaining appointments and preparing for DC     ASSESSMENT    Assessment/Changes in Function:     Excellent response towards aquatherapy and alleviating swelling     []  See Progress Note/Recertification   Patient will continue to benefit from skilled PT services to modify and progress therapeutic interventions, address functional mobility deficits, address ROM deficits, address strength deficits, analyze and address soft tissue restrictions, analyze and cue movement patterns, analyze and modify body mechanics/ergonomics and instruct in home and community integration to attain remaining goals. to attain remaining goals. Progress toward goals / Updated goals:    Progressing well towards DC     PLAN    []  Upgrade activities as tolerated YES Continue plan of care   []  Discharge due to :    []  Other:      Therapist: Rosario Mcgee \"BJ\" JANAE Posey, Cert. MDT, Cert.  DN    Date: 4/23/2018 Time: 1:06 PM   Future Appointments  Date Time Provider Elayne Johnston   4/26/2018 11:00 AM Viral Patel PT [facility]   4/30/2018 8:30 AM Rodri Mccracken [facility]

## 2018-04-26 ENCOUNTER — APPOINTMENT (OUTPATIENT)
Dept: PHYSICAL THERAPY | Age: 58
End: 2018-04-26
Payer: COMMERCIAL

## 2018-04-27 ENCOUNTER — HOSPITAL ENCOUNTER (OUTPATIENT)
Dept: PHYSICAL THERAPY | Age: 58
Discharge: HOME | End: 2018-04-27
Payer: COMMERCIAL

## 2018-04-27 PROCEDURE — 97110 THERAPEUTIC EXERCISES: CPT

## 2018-04-27 PROCEDURE — 97014 ELECTRIC STIMULATION THERAPY: CPT

## 2018-04-27 NOTE — PROGRESS NOTES
PHYSICAL THERAPY - DAILY TREATMENT NOTE    Patient Name: Charmayne Mettle        Date: 2018  : 1960   YES Patient  Verified  Visit #:   32   of   30  Insurance: Payor: BLUE CROSS / Plan: [facility] PPO / Product Type: PPO /      In time: 7:55 Out time: 8:50   Total Treatment Time: 55     TREATMENT AREA = Left knee pain [M25.562]    SUBJECTIVE    Pain Level (on 0 to 10 scale):  8  / 10   Medication Changes/New allergies or changes in medical history, any new surgeries or procedures? NO    If yes, update Summary List   Subjective Functional Status/Changes:  []  No changes reported     \"Been a rough few days, having trouble sleeping. Past 2 days. I had to stand all day for work on Tuesday. I wore the brace, tried to take brace and ice. I ended up trying to take it easy on Wednesday and Thursday. Had to use my walker last night b/c I was could barely put weight on the leg from the swelling and pain. It's gone down a lot now. \"          OBJECTIVE  Therapeutic Procedures:  Min Procedure Specifics + Rationale   n/a [x]  Patient Education (performed throughout session) [x] Review HEP    [] Progressed/Changed HEP based on:   [] proper performance and advancement of Therex/TA   [] reduction in pain level    [] increased functional capacity       [] change in directional preference   50 [x] Therapeutic Exercise   [x]  See Flowsheet   Rationale: increase ROM and increase strength to improve the patients ability to participate in ADL's      Modality rationale: decrease inflammation, decrease pain, increase tissue extensibility and increase muscle contraction/control to improve the patients ability to perform ADL's with greater ease   Min Type Additional Details   15 [x] E-Stim:       [] Att                 [x] Unatt                                [] Premod         [x] IFC                          [] NMES           [] TENS instruct                          [] EDN: Location: Left knee  [x] supine              [] prone  [x] legs elevatedv   [] legs flat  []w/heat  [x]w/ice  []w/US    [] Skin assessment post-treatment:  []intact []redness- no adverse reaction       []redness  adverse reaction:     Other Objective/Functional Measures:    Shifted focus of rx to unloaded therex. Discussed getting into the pool this weekend to assist in reducing swelling. Post Treatment Pain Level (on 0 to 10) scale:   4 / 10     ASSESSMENT    Assessment/Changes in Function:     Difficulty attaining full AROM due to swelling. Patient will continue to benefit from skilled PT services to instruct in home and community integration  to attain remaining goals   Progress toward goals / Updated goals:     Progressing towards dC     PLAN    [x]  Upgrade activities as tolerated  [x]  Update interventions per flow sheet YES Continue plan of care   []  Discharge due to :    []  Other:      Therapist: Ronda Jalloh \"BJ\" JANAE Posey, Cert. MDT, Cert. DN, Cert.  SMT    Date: 4/27/2018 Time: 7:59 AM   Future Appointments  Date Time Provider Elayne Johnston   4/27/2018 8:00 AM Amanda French [facility]   4/30/2018 8:30 AM Rachel Davis [facility]

## 2018-04-30 ENCOUNTER — HOSPITAL ENCOUNTER (OUTPATIENT)
Dept: PHYSICAL THERAPY | Age: 58
Discharge: HOME | End: 2018-04-30
Payer: COMMERCIAL

## 2018-04-30 PROCEDURE — 97530 THERAPEUTIC ACTIVITIES: CPT

## 2018-04-30 NOTE — PROGRESS NOTES
PHYSICAL THERAPY - DAILY TREATMENT NOTE      Patient Name: Faviola Monroy        Date: 2018  : 1960   YES Patient  Verified  Visit #:     Insurance: Payor: BLUE CROSS / Plan: [facility] PPO / Product Type: PPO /      In time: 8:20 Out time: 8:32   Total Treatment Time: 12 min       TREATMENT AREA =  Left knee pain [M25.562]    SUBJECTIVE    Pain Level (on 0 to 10 scale):  3  / 10   Medication Changes/New allergies or changes in medical history, any new surgeries or procedures? NO    If yes, update Summary List   Subjective Functional Status/Changes:  []  No changes reported     \"Its because of work that it swells up. \"        OBJECTIVE    12 min Therapeutic Activity: FOTO, Re-evaluation   Rationale: To assess current functional limitations and review POC    1 min Patient Education:  YES  Reviewed HEP   []  Progressed/Changed HEP based on: Other Objective/Functional Measures:    ROM / Strength                                        AROM                   PROM                   Strength (1-5)    Left Right Left Right Left Right   Knee Flexion 110          Extension -20           Post Treatment Pain Level (on 0 to 10) scale:   3  / 10     ASSESSMENT    Assessment/Changes in Function:     See D/C note     []  See Progress Note/Recertification      Progress toward goals / Updated goals:    See D/C note     PLAN    [x]  Upgrade activities as tolerated YES Continue plan of care   []  Discharge due to :    []  Other:      Therapist: Toya Burris DPT, Cert.  DN    Date: 2018 Time: 7:25 AM     Future Appointments  Date Time Provider Elayne Johnston   2018 8:30 AM [facility]

## 2018-05-01 NOTE — PROGRESS NOTES
Jamari Gonzalez 29  [facility] PHYSICAL THERAPY  [address] [facility] [address], [address] - Phone: [phone]  Fax: [phone] SUMMARY  Patient Name: Tessa Jeronimo : 1960   Treatment/Medical Diagnosis: Left knee pain [M25.562]   Referral Source: Liz Mcgowan MD     Date of Initial Visit: 18 Attended Visits: 28 Missed Visits: 1     SUMMARY OF TREATMENT  Patient's POC has consisted of therex, therapeutic activities, manual therapy prn, modalities prn, pt. education, and a comprehensive HEP. Treatment strategies used to address functional mobility deficits, ROM deficits, strength deficits, analyze and address soft tissue restrictions, analyze and cue movement patterns, analyze and modify body mechanics/ergonomics, assess and modify postural abnormalities and instruct in home and community integration. CURRENT STATUS  Patient progressed very well for initial 1 month following TKA. Patient has recently regressed due to increased work schedule often necessitating patient stand/ambulate > 10 hours without rest. Pt's left knee AROM currently measures at 0- with continued edema. Pt reports minimal limitations and pain when able to rest knee appropriately. Pt was transferred to our aquatic program this past month with good success. Pt has joined the [facility] to continue aquatic program upon D/C. Pt will be D/C at this time with comprehensive HEP. G-Codes: n/a    RECOMMENDATIONS  Discontinue therapy due to lack of appreciable progress towards goals. If you have any questions/comments please contact us directly at [phone]. Thank you for allowing us to assist in the care of your patient. Therapist Signature: Caro Ramos DPT, Cert.  DN Date: 18   Reporting Period: Na Time: 11:35 AM     ===========================================================================================  I certify that the above Physical Therapy Services are being furnished while the patient is under my care.     ___ I have read the above report and request that my patient be discharged from therapy. Physician Signature:        Date:       Time:     Please sign and return to [employer] or you may fax the signed copy to [phone]. Thank you.

## (undated) DEVICE — TABLE COVER: Brand: CONVERTORS

## (undated) DEVICE — SYRINGE MED 20ML STD CLR PLAS LUERLOCK TIP N CTRL DISP

## (undated) DEVICE — INTENDED FOR TISSUE SEPARATION, AND OTHER PROCEDURES THAT REQUIRE A SHARP SURGICAL BLADE TO PUNCTURE OR CUT.: Brand: BARD-PARKER SAFETY BLADES SIZE 10, STERILE

## (undated) DEVICE — LIGHT HANDLE: Brand: DEVON

## (undated) DEVICE — SHEET,DRAPE,70X100,STERILE: Brand: MEDLINE

## (undated) DEVICE — BLADE SURG SAW OSC SAG 90MM LEN 25MM W 1.19MM THCK AGG 90MM

## (undated) DEVICE — PAD PREP ALCOHOL LG STERILE -- CONVERT TO ITEM 305014

## (undated) DEVICE — NDL SPNE QNCKE 18GX3.5IN LF --

## (undated) DEVICE — Device

## (undated) DEVICE — HANDPIECE SET WITH SOFT TISSUE TIP AND SUCTION TUBE: Brand: INTERPULSE

## (undated) DEVICE — MAYO STAND COVER: Brand: CONVERTORS

## (undated) DEVICE — PREP SKN CHLRAPRP 26ML TNT -- CONVERT TO ITEM 373320

## (undated) DEVICE — (D)SYR 10ML 1/5ML GRAD NSAF -- PKGING CHANGE USE ITEM 338027

## (undated) DEVICE — SPONGE GZ W4XL4IN COT 12 PLY TYP VII WVN C FLD DSGN

## (undated) DEVICE — SUTURE ABSORBABLE BRAIDED 2-0 CT-1 27 IN UD VICRYL J259H

## (undated) DEVICE — DEPAUL TOTAL JOINT CDS: Brand: MEDLINE INDUSTRIES, INC.

## (undated) DEVICE — BOWL AND CEMENT CARTRIDGE WITH BREAKAWAY FEMORAL NOZZLE: Brand: ACM

## (undated) DEVICE — SOLUTION IRRIG 3000ML 0.9% SOD CHL FLX CONT 0797208] ICU MEDICAL INC]

## (undated) DEVICE — DECANTER VI C-FLO LF --

## (undated) DEVICE — DRSG GZ OIL EMUL CURAD 3X8 --

## (undated) DEVICE — SYR LR LCK 1ML GRAD NSAF 30ML --

## (undated) DEVICE — SUTURE VCRL SZ 0 L18IN ABSRB UD L36MM CT-1 1/2 CIR J840D

## (undated) DEVICE — TOWEL SURG W16XL26IN BLU NONFENESTRATED DLX ST 2 PER PK

## (undated) DEVICE — DISPOSABLE TOURNIQUET CUFF SINGLE BLADDER, SINGLE PORT AND QUICK CONNECT CONNECTOR: Brand: COLOR CUFF

## (undated) DEVICE — Z DISCONTINUED USE 2219801 STAPLER SKIN REG CRWN L5.7MM LEG L3.9MM WIRE DIA0.53MM PROX

## (undated) DEVICE — KENDALL SCD EXPRESS SLEEVES, KNEE LENGTH, MEDIUM: Brand: KENDALL SCD

## (undated) DEVICE — NDL PRT INJ NSAF BLNT 18GX1.5 --

## (undated) DEVICE — STERILE LATEX POWDER-FREE SURGICAL GLOVESWITH NITRILE COATING: Brand: PROTEXIS

## (undated) DEVICE — 2108 SERIES SAGITTAL BLADE (12.5 X 0.88 X 90.5MM)

## (undated) DEVICE — REM POLYHESIVE ADULT PATIENT RETURN ELECTRODE: Brand: VALLEYLAB